# Patient Record
Sex: MALE | Race: OTHER | HISPANIC OR LATINO | URBAN - METROPOLITAN AREA
[De-identification: names, ages, dates, MRNs, and addresses within clinical notes are randomized per-mention and may not be internally consistent; named-entity substitution may affect disease eponyms.]

---

## 2017-06-05 ENCOUNTER — OUTPATIENT (OUTPATIENT)
Dept: OUTPATIENT SERVICES | Facility: HOSPITAL | Age: 42
LOS: 1 days | Discharge: HOME | End: 2017-06-05

## 2017-06-05 ENCOUNTER — APPOINTMENT (OUTPATIENT)
Dept: PODIATRY | Facility: CLINIC | Age: 42
End: 2017-06-05

## 2017-06-05 VITALS
BODY MASS INDEX: 36.91 KG/M2 | SYSTOLIC BLOOD PRESSURE: 110 MMHG | WEIGHT: 188 LBS | DIASTOLIC BLOOD PRESSURE: 70 MMHG | HEART RATE: 76 BPM | HEIGHT: 60 IN

## 2017-06-28 DIAGNOSIS — M79.671 PAIN IN RIGHT FOOT: ICD-10-CM

## 2017-06-28 DIAGNOSIS — B35.1 TINEA UNGUIUM: ICD-10-CM

## 2017-06-28 DIAGNOSIS — L60.0 INGROWING NAIL: ICD-10-CM

## 2017-06-28 DIAGNOSIS — M79.672 PAIN IN LEFT FOOT: ICD-10-CM

## 2017-08-22 ENCOUNTER — OUTPATIENT (OUTPATIENT)
Dept: OUTPATIENT SERVICES | Facility: HOSPITAL | Age: 42
LOS: 1 days | Discharge: HOME | End: 2017-08-22

## 2017-09-27 ENCOUNTER — APPOINTMENT (OUTPATIENT)
Dept: INTERNAL MEDICINE | Facility: CLINIC | Age: 42
End: 2017-09-27

## 2017-09-27 ENCOUNTER — OUTPATIENT (OUTPATIENT)
Dept: OUTPATIENT SERVICES | Facility: HOSPITAL | Age: 42
LOS: 1 days | Discharge: HOME | End: 2017-09-27

## 2017-09-27 VITALS
BODY MASS INDEX: 31.37 KG/M2 | DIASTOLIC BLOOD PRESSURE: 80 MMHG | WEIGHT: 186 LBS | HEART RATE: 66 BPM | SYSTOLIC BLOOD PRESSURE: 132 MMHG | HEIGHT: 64.5 IN

## 2017-09-28 LAB
T4 AB SER-ACNC: 7.3 UG/DL
T4 FREE SERPL-MCNC: 1.2 NG/DL
TSH SERPL DL<=0.005 MIU/L-ACNC: 5.4 UIU/ML

## 2017-10-16 ENCOUNTER — APPOINTMENT (OUTPATIENT)
Dept: NUTRITION | Facility: CLINIC | Age: 42
End: 2017-10-16

## 2017-10-16 VITALS — WEIGHT: 184.5 LBS | BODY MASS INDEX: 31.18 KG/M2

## 2017-10-30 ENCOUNTER — APPOINTMENT (OUTPATIENT)
Dept: INTERNAL MEDICINE | Facility: CLINIC | Age: 42
End: 2017-10-30

## 2017-10-30 ENCOUNTER — OUTPATIENT (OUTPATIENT)
Dept: OUTPATIENT SERVICES | Facility: HOSPITAL | Age: 42
LOS: 1 days | Discharge: HOME | End: 2017-10-30

## 2017-10-30 VITALS
WEIGHT: 183 LBS | HEIGHT: 64.5 IN | SYSTOLIC BLOOD PRESSURE: 113 MMHG | BODY MASS INDEX: 30.86 KG/M2 | HEART RATE: 81 BPM | DIASTOLIC BLOOD PRESSURE: 75 MMHG

## 2017-11-28 ENCOUNTER — OUTPATIENT (OUTPATIENT)
Dept: OUTPATIENT SERVICES | Facility: HOSPITAL | Age: 42
LOS: 1 days | Discharge: HOME | End: 2017-11-28

## 2018-02-20 ENCOUNTER — OUTPATIENT (OUTPATIENT)
Dept: OUTPATIENT SERVICES | Facility: HOSPITAL | Age: 43
LOS: 1 days | Discharge: HOME | End: 2018-02-20

## 2018-02-20 DIAGNOSIS — K02.53 DENTAL CARIES ON PIT AND FISSURE SURFACE PENETRATING INTO PULP: ICD-10-CM

## 2018-04-26 ENCOUNTER — OUTPATIENT (OUTPATIENT)
Dept: OUTPATIENT SERVICES | Facility: HOSPITAL | Age: 43
LOS: 1 days | Discharge: HOME | End: 2018-04-26

## 2018-04-27 DIAGNOSIS — K02.53 DENTAL CARIES ON PIT AND FISSURE SURFACE PENETRATING INTO PULP: ICD-10-CM

## 2018-05-10 ENCOUNTER — OUTPATIENT (OUTPATIENT)
Dept: OUTPATIENT SERVICES | Facility: HOSPITAL | Age: 43
LOS: 1 days | Discharge: HOME | End: 2018-05-10

## 2018-05-10 DIAGNOSIS — K02.53 DENTAL CARIES ON PIT AND FISSURE SURFACE PENETRATING INTO PULP: ICD-10-CM

## 2018-05-11 ENCOUNTER — OUTPATIENT (OUTPATIENT)
Dept: OUTPATIENT SERVICES | Facility: HOSPITAL | Age: 43
LOS: 1 days | Discharge: HOME | End: 2018-05-11

## 2018-05-24 ENCOUNTER — OUTPATIENT (OUTPATIENT)
Dept: OUTPATIENT SERVICES | Facility: HOSPITAL | Age: 43
LOS: 1 days | Discharge: HOME | End: 2018-05-24

## 2018-05-24 DIAGNOSIS — E02 SUBCLINICAL IODINE-DEFICIENCY HYPOTHYROIDISM: ICD-10-CM

## 2018-05-25 LAB — TSH SERPL-ACNC: 8.61 UIU/ML

## 2018-05-29 ENCOUNTER — FORM ENCOUNTER (OUTPATIENT)
Age: 43
End: 2018-05-29

## 2018-05-30 ENCOUNTER — OUTPATIENT (OUTPATIENT)
Dept: OUTPATIENT SERVICES | Facility: HOSPITAL | Age: 43
LOS: 1 days | Discharge: HOME | End: 2018-05-30

## 2018-05-30 ENCOUNTER — APPOINTMENT (OUTPATIENT)
Dept: INTERNAL MEDICINE | Facility: CLINIC | Age: 43
End: 2018-05-30

## 2018-05-30 VITALS
DIASTOLIC BLOOD PRESSURE: 80 MMHG | WEIGHT: 185 LBS | HEART RATE: 72 BPM | SYSTOLIC BLOOD PRESSURE: 119 MMHG | BODY MASS INDEX: 31.2 KG/M2 | HEIGHT: 64.5 IN

## 2018-05-30 DIAGNOSIS — E03.9 HYPOTHYROIDISM, UNSPECIFIED: ICD-10-CM

## 2018-05-30 DIAGNOSIS — M19.90 UNSPECIFIED OSTEOARTHRITIS, UNSPECIFIED SITE: ICD-10-CM

## 2018-05-30 DIAGNOSIS — M17.0 BILATERAL PRIMARY OSTEOARTHRITIS OF KNEE: ICD-10-CM

## 2018-05-30 RX ORDER — NYSTATIN 100000 [USP'U]/G
100000 CREAM TOPICAL
Qty: 3 | Refills: 10 | Status: DISCONTINUED | COMMUNITY
Start: 2017-06-05 | End: 2018-05-30

## 2018-05-30 NOTE — ASSESSMENT
[FreeTextEntry1] : 42 M w/ no significant pmh besides subclinical hypothyroidism not on any treatment who presents to the office for after repeat TSH is 8.6.\par \par # Hypothyroidism\par - Start Levothyroxine 25mcg\par \par # Knee pain\par - B/l Knee xrays\par - tylenol prn\par \par # HCM\par - F/u 6 months and prn\par - Repeat TSH

## 2018-05-30 NOTE — HISTORY OF PRESENT ILLNESS
[de-identified] : 42 M w/ no pmh here for a follow up on TSH results. Last year he had a TSh of 5.4 and he was asymptomatic so no treatment was initiated. He now has a recent TSH above 8 and presents for the results. He currently has no complaints besides occasional forgetfulness, fatigue and a " crackling " sound when flexing his knees, denies any pain, swelling etc.

## 2018-09-20 ENCOUNTER — OUTPATIENT (OUTPATIENT)
Dept: OUTPATIENT SERVICES | Facility: HOSPITAL | Age: 43
LOS: 1 days | Discharge: HOME | End: 2018-09-20

## 2018-09-20 DIAGNOSIS — K02.53 DENTAL CARIES ON PIT AND FISSURE SURFACE PENETRATING INTO PULP: ICD-10-CM

## 2018-11-08 ENCOUNTER — OUTPATIENT (OUTPATIENT)
Dept: OUTPATIENT SERVICES | Facility: HOSPITAL | Age: 43
LOS: 1 days | Discharge: HOME | End: 2018-11-08

## 2018-11-12 LAB — TSH SERPL-ACNC: 12.58 UIU/ML

## 2018-11-14 ENCOUNTER — APPOINTMENT (OUTPATIENT)
Dept: INTERNAL MEDICINE | Facility: CLINIC | Age: 43
End: 2018-11-14

## 2018-11-14 ENCOUNTER — OUTPATIENT (OUTPATIENT)
Dept: OUTPATIENT SERVICES | Facility: HOSPITAL | Age: 43
LOS: 1 days | Discharge: HOME | End: 2018-11-14

## 2018-11-14 VITALS
DIASTOLIC BLOOD PRESSURE: 80 MMHG | HEART RATE: 63 BPM | BODY MASS INDEX: 32.44 KG/M2 | SYSTOLIC BLOOD PRESSURE: 129 MMHG | TEMPERATURE: 96 F | HEIGHT: 64 IN | WEIGHT: 190 LBS

## 2018-11-14 NOTE — HISTORY OF PRESENT ILLNESS
[de-identified] : 42 M w/ no pmh here for a follow up on TSH results. On Last visit, the patient's TSH was above 8 and he was started on therapy. TSH recheck shows an increase to 12. Patient denies any symptoms. No significant weight gain, constipation, hair loss, or fatigue. Patient says he feels well. He has been compliant with his medication. Denies any known family history of thyroid disorders.

## 2018-11-14 NOTE — REVIEW OF SYSTEMS
[Fever] : no fever [Chills] : no chills [Fatigue] : no fatigue [Night Sweats] : no night sweats [Recent Change In Weight] : ~T no recent weight change [Vision Problems] : no vision problems [Chest Pain] : no chest pain [Palpitations] : no palpitations [Shortness Of Breath] : no shortness of breath [Wheezing] : no wheezing [Cough] : no cough [Abdominal Pain] : no abdominal pain [Nausea] : no nausea [Constipation] : no constipation [Vomiting] : no vomiting [Dysuria] : no dysuria [Incontinence] : no incontinence [Frequency] : no frequency [Joint Pain] : no joint pain [Back Pain] : no back pain [Joint Swelling] : no joint swelling

## 2018-11-14 NOTE — ASSESSMENT
[FreeTextEntry1] : 42 M w/ no significant pmh besides subclinical hypothyroidism not on any treatment who presents to the office after repeat TSH is 12. Denies any active symptoms \par \par # Hypothyroidism\par - Increase Levothyroxine to 50mcg\par - Check Free T3 and T4\par \par # HCM\par - Routine labs including CBC, CMP, and lipid profile \par - F/u 6 months and prn\par \par

## 2018-11-14 NOTE — PHYSICAL EXAM
[No Acute Distress] : no acute distress [Well Nourished] : well nourished [Normal Sclera/Conjunctiva] : normal sclera/conjunctiva [Normal Outer Ear/Nose] : the outer ears and nose were normal in appearance [No Respiratory Distress] : no respiratory distress  [Clear to Auscultation] : lungs were clear to auscultation bilaterally [Normal Rate] : normal rate  [Regular Rhythm] : with a regular rhythm [Soft] : abdomen soft [Non Tender] : non-tender [No HSM] : no HSM [Normal Bowel Sounds] : normal bowel sounds [No CVA Tenderness] : no CVA  tenderness [No Joint Swelling] : no joint swelling

## 2018-11-29 ENCOUNTER — OUTPATIENT (OUTPATIENT)
Dept: OUTPATIENT SERVICES | Facility: HOSPITAL | Age: 43
LOS: 1 days | Discharge: HOME | End: 2018-11-29

## 2018-12-11 ENCOUNTER — OUTPATIENT (OUTPATIENT)
Dept: OUTPATIENT SERVICES | Facility: HOSPITAL | Age: 43
LOS: 1 days | Discharge: HOME | End: 2018-12-11

## 2018-12-11 DIAGNOSIS — K02.9 DENTAL CARIES, UNSPECIFIED: ICD-10-CM

## 2018-12-18 ENCOUNTER — OUTPATIENT (OUTPATIENT)
Dept: OUTPATIENT SERVICES | Facility: HOSPITAL | Age: 43
LOS: 1 days | Discharge: HOME | End: 2018-12-18

## 2019-02-15 ENCOUNTER — APPOINTMENT (OUTPATIENT)
Dept: INTERNAL MEDICINE | Facility: CLINIC | Age: 44
End: 2019-02-15

## 2022-06-13 ENCOUNTER — APPOINTMENT (OUTPATIENT)
Dept: INTERNAL MEDICINE | Facility: CLINIC | Age: 47
End: 2022-06-13
Payer: SUBSIDIZED

## 2022-06-13 ENCOUNTER — OUTPATIENT (OUTPATIENT)
Dept: OUTPATIENT SERVICES | Facility: HOSPITAL | Age: 47
LOS: 1 days | Discharge: HOME | End: 2022-06-13

## 2022-06-13 VITALS
SYSTOLIC BLOOD PRESSURE: 127 MMHG | TEMPERATURE: 97.3 F | BODY MASS INDEX: 32.44 KG/M2 | OXYGEN SATURATION: 96 % | WEIGHT: 190 LBS | HEIGHT: 64 IN | DIASTOLIC BLOOD PRESSURE: 86 MMHG | HEART RATE: 106 BPM

## 2022-06-13 DIAGNOSIS — B35.6 TINEA CRURIS: ICD-10-CM

## 2022-06-13 PROCEDURE — 99214 OFFICE O/P EST MOD 30 MIN: CPT | Mod: GC

## 2022-06-13 NOTE — ASSESSMENT
[FreeTextEntry1] : 46 M w/ no significant PMH besides subclinical hypothyroidism not on any treatment who presents to the office after repeat TSH is 12. Denies any active symptoms \par \par # Hypothyroidism/constipation\par - Patient did not take any medications for 4 years \par - Check  TSH \par - consider restart levothyroxine pending TSH level\par - RTC 1 month\par - hydration\par - high fiber diet\par \par #Skin rash \par -located at the Scrotal area bilaterally, looking like its healing with over the counter cream\par -Continue using the OTC cream lotrimin to affected area of groin topically twice daily \par \par # HCM\par - Routine labs including CBC, CMP, and lipid profile, A1C, TSH\par - F/u 1 months with blood work up \par - GI referral for Screening colonoscopy \par \par

## 2022-06-13 NOTE — HISTORY OF PRESENT ILLNESS
[[employer] ] : provided by [employer]   [Interpreters_IDNumber] : [id number] [de-identified] : 46 M w/ no PMH here for his annual exam. On Last visit (4 years ago), the patient's TSH was above 8 and he was started on medication.   TSH recheck shows an increase to 12.  However, pt. stopped medication once ran out of medication, and did not return to follow up.  Patient complains of constipation today that has been going on for the last 6 months ( described as hard stool but have regular bowel movement ). Patient denied any fatigue/lethargy, change in appetite or weight, cold intolerance. Patient also complained of itchy rash in the groin region.

## 2022-06-13 NOTE — PHYSICAL EXAM
[Well Developed] : well developed [No Accessory Muscle Use] : no accessory muscle use [Normal S1, S2] : normal S1 and S2 [No Acute Distress] : no acute distress [Well Nourished] : well nourished [Normal Sclera/Conjunctiva] : normal sclera/conjunctiva [Normal Outer Ear/Nose] : the outer ears and nose were normal in appearance [No Respiratory Distress] : no respiratory distress  [Clear to Auscultation] : lungs were clear to auscultation bilaterally [Normal Rate] : normal rate  [Regular Rhythm] : with a regular rhythm [Soft] : abdomen soft [Non Tender] : non-tender [No HSM] : no HSM [No CVA Tenderness] : no CVA  tenderness [No Joint Swelling] : no joint swelling [de-identified] : mild erythema/edema of b/l groin area

## 2022-06-14 ENCOUNTER — LABORATORY RESULT (OUTPATIENT)
Age: 47
End: 2022-06-14

## 2022-06-15 ENCOUNTER — NON-APPOINTMENT (OUTPATIENT)
Age: 47
End: 2022-06-15

## 2022-06-15 LAB
ALBUMIN SERPL ELPH-MCNC: 4.9 G/DL
ALP BLD-CCNC: 106 U/L
ALT SERPL-CCNC: 102 U/L
ANION GAP SERPL CALC-SCNC: 13 MMOL/L
AST SERPL-CCNC: 53 U/L
BASOPHILS # BLD AUTO: 0.04 K/UL
BASOPHILS NFR BLD AUTO: 0.5 %
BILIRUB SERPL-MCNC: 0.9 MG/DL
BUN SERPL-MCNC: 20 MG/DL
CALCIUM SERPL-MCNC: 9.6 MG/DL
CHLORIDE SERPL-SCNC: 103 MMOL/L
CHOLEST SERPL-MCNC: 195 MG/DL
CO2 SERPL-SCNC: 25 MMOL/L
CREAT SERPL-MCNC: 0.9 MG/DL
EGFR: 107 ML/MIN/1.73M2
EOSINOPHIL # BLD AUTO: 0.13 K/UL
EOSINOPHIL NFR BLD AUTO: 1.7 %
ESTIMATED AVERAGE GLUCOSE: 154 MG/DL
GLUCOSE SERPL-MCNC: 116 MG/DL
HBA1C MFR BLD HPLC: 7 %
HCT VFR BLD CALC: 46.4 %
HDLC SERPL-MCNC: 51 MG/DL
HGB BLD-MCNC: 16.1 G/DL
IMM GRANULOCYTES NFR BLD AUTO: 0.3 %
LDLC SERPL CALC-MCNC: 119 MG/DL
LYMPHOCYTES # BLD AUTO: 2.95 K/UL
LYMPHOCYTES NFR BLD AUTO: 39.6 %
MAN DIFF?: NORMAL
MCHC RBC-ENTMCNC: 31.8 PG
MCHC RBC-ENTMCNC: 34.7 G/DL
MCV RBC AUTO: 91.5 FL
MONOCYTES # BLD AUTO: 0.29 K/UL
MONOCYTES NFR BLD AUTO: 3.9 %
NEUTROPHILS # BLD AUTO: 4.02 K/UL
NEUTROPHILS NFR BLD AUTO: 54 %
NONHDLC SERPL-MCNC: 144 MG/DL
PLATELET # BLD AUTO: 170 K/UL
POTASSIUM SERPL-SCNC: 4.3 MMOL/L
PROT SERPL-MCNC: 7.7 G/DL
RBC # BLD: 5.07 M/UL
RBC # FLD: 12.6 %
SODIUM SERPL-SCNC: 141 MMOL/L
TRIGL SERPL-MCNC: 125 MG/DL
TSH SERPL-ACNC: 7.5 UIU/ML
WBC # FLD AUTO: 7.45 K/UL

## 2022-06-23 ENCOUNTER — OUTPATIENT (OUTPATIENT)
Dept: OUTPATIENT SERVICES | Facility: HOSPITAL | Age: 47
LOS: 1 days | Discharge: HOME | End: 2022-06-23

## 2022-06-23 ENCOUNTER — APPOINTMENT (OUTPATIENT)
Dept: INTERNAL MEDICINE | Facility: CLINIC | Age: 47
End: 2022-06-23
Payer: SUBSIDIZED

## 2022-06-23 ENCOUNTER — LABORATORY RESULT (OUTPATIENT)
Age: 47
End: 2022-06-23

## 2022-06-23 ENCOUNTER — NON-APPOINTMENT (OUTPATIENT)
Age: 47
End: 2022-06-23

## 2022-06-23 VITALS
BODY MASS INDEX: 37.3 KG/M2 | DIASTOLIC BLOOD PRESSURE: 76 MMHG | TEMPERATURE: 97.5 F | HEART RATE: 79 BPM | WEIGHT: 190 LBS | HEIGHT: 60 IN | SYSTOLIC BLOOD PRESSURE: 120 MMHG | OXYGEN SATURATION: 99 %

## 2022-06-23 DIAGNOSIS — Z00.00 ENCOUNTER FOR GENERAL ADULT MEDICAL EXAMINATION WITHOUT ABNORMAL FINDINGS: ICD-10-CM

## 2022-06-23 DIAGNOSIS — B35.6 TINEA CRURIS: ICD-10-CM

## 2022-06-23 DIAGNOSIS — K59.00 CONSTIPATION, UNSPECIFIED: ICD-10-CM

## 2022-06-23 DIAGNOSIS — E03.8 OTHER SPECIFIED HYPOTHYROIDISM: ICD-10-CM

## 2022-06-23 PROCEDURE — 99214 OFFICE O/P EST MOD 30 MIN: CPT | Mod: GC

## 2022-06-23 RX ORDER — LEVOTHYROXINE SODIUM 0.05 MG/1
50 TABLET ORAL DAILY
Qty: 30 | Refills: 5 | Status: DISCONTINUED | COMMUNITY
Start: 2018-11-14 | End: 2022-06-23

## 2022-06-23 RX ORDER — LEVOTHYROXINE SODIUM 0.03 MG/1
25 TABLET ORAL DAILY
Qty: 30 | Refills: 6 | Status: DISCONTINUED | COMMUNITY
Start: 2018-05-30 | End: 2022-06-23

## 2022-06-23 NOTE — ASSESSMENT
[FreeTextEntry1] : This is a 45 y/o M with PMHx of hypothyroidism and constipation is here for a follow up visit for labs.\par \par #Subclinical Hypothyroidism\par #Constipation \par - TSH 7.50, Free T4 0.9\par - check T3 level \par - starting on levothyroxine 50mcg daily\par - hydration\par - high fiber diet\par \par #DM \par - Hb A1C 7.0\par - diet and lifestyle modification\par - starting on Metformin 500mg BID  \par - Podiatry and Omphalotomy referral \par \par #Transaminitis\par - denies abdominal pain \par - AST 53, \par - RUQ US \par \par #HLD \par - , HDL 51, Cholesterol 195, Triglyceride 125\par - Diet and lifestyle modification advised \par - not starting on statin due to transaminitis, reevaluate based on LFTs \par \par #Skin rash - resolved \par \par # HCM\par - COVID Vaccine x3 Moderna \par - GI for screening colonoscopy \par - Repeat labs \par - f/u in 3 months \par

## 2022-06-23 NOTE — PHYSICAL EXAM
[No Acute Distress] : no acute distress [Well Nourished] : well nourished [Well Developed] : well developed [Well-Appearing] : well-appearing [Normal Sclera/Conjunctiva] : normal sclera/conjunctiva [Normal Outer Ear/Nose] : the outer ears and nose were normal in appearance [No JVD] : no jugular venous distention [No Respiratory Distress] : no respiratory distress  [No Accessory Muscle Use] : no accessory muscle use [Clear to Auscultation] : lungs were clear to auscultation bilaterally [Normal Rate] : normal rate  [Regular Rhythm] : with a regular rhythm [Normal S1, S2] : normal S1 and S2 [No Murmur] : no murmur heard [Soft] : abdomen soft [Non Tender] : non-tender [Non-distended] : non-distended [No Masses] : no abdominal mass palpated [No HSM] : no HSM [Normal Bowel Sounds] : normal bowel sounds

## 2022-06-23 NOTE — REVIEW OF SYSTEMS
[Constipation] : constipation [Fever] : no fever [Chills] : no chills [Fatigue] : no fatigue [Night Sweats] : no night sweats [Discharge] : no discharge [Earache] : no earache [Chest Pain] : no chest pain [Palpitations] : no palpitations [Claudication] : no  leg claudication [Lower Ext Edema] : no lower extremity edema [Orthopena] : no orthopnea [Paroxysmal Nocturnal Dyspnea] : no paroxysmal nocturnal dyspnea [Shortness Of Breath] : no shortness of breath [Wheezing] : no wheezing [Cough] : no cough [Dyspnea on Exertion] : not dyspnea on exertion [Abdominal Pain] : no abdominal pain [Nausea] : no nausea [Diarrhea] : no diarrhea [Vomiting] : no vomiting [Heartburn] : no heartburn [Melena] : no melena [Dysuria] : no dysuria [Headache] : no headache

## 2022-06-23 NOTE — HISTORY OF PRESENT ILLNESS
[FreeTextEntry1] : Follow up visit  [de-identified] : This is a 45 y/o M with PMHx of hypothyroidism and constipation is here for a follow up visit for labs. Patient was seen in the clinic 10 days ago for establishment of care. \par Labs significant for Hb A1C of 7.0, transaminitis, HLD, TSH of 7.50 and  Free T4 0.9. \par \par History was obtained using  service [facility] ([id number])\par \par Patient endorses constipation. Patient denies abdominal pain, n/v. Patient denies alcohol use. Patient endorses taking Tylenol sometimes.

## 2022-06-27 DIAGNOSIS — E78.5 HYPERLIPIDEMIA, UNSPECIFIED: ICD-10-CM

## 2022-06-27 DIAGNOSIS — K59.00 CONSTIPATION, UNSPECIFIED: ICD-10-CM

## 2022-06-27 DIAGNOSIS — R74.01 ELEVATION OF LEVELS OF LIVER TRANSAMINASE LEVELS: ICD-10-CM

## 2022-06-27 DIAGNOSIS — Z00.00 ENCOUNTER FOR GENERAL ADULT MEDICAL EXAMINATION WITHOUT ABNORMAL FINDINGS: ICD-10-CM

## 2022-06-27 DIAGNOSIS — E11.9 TYPE 2 DIABETES MELLITUS WITHOUT COMPLICATIONS: ICD-10-CM

## 2022-06-27 DIAGNOSIS — E03.9 HYPOTHYROIDISM, UNSPECIFIED: ICD-10-CM

## 2022-06-28 ENCOUNTER — NON-APPOINTMENT (OUTPATIENT)
Age: 47
End: 2022-06-28

## 2022-07-11 ENCOUNTER — OUTPATIENT (OUTPATIENT)
Dept: OUTPATIENT SERVICES | Facility: HOSPITAL | Age: 47
LOS: 1 days | Discharge: HOME | End: 2022-07-11

## 2022-07-11 ENCOUNTER — APPOINTMENT (OUTPATIENT)
Dept: PODIATRY | Facility: CLINIC | Age: 47
End: 2022-07-11

## 2022-07-11 PROCEDURE — 99203 OFFICE O/P NEW LOW 30 MIN: CPT

## 2022-07-12 ENCOUNTER — OUTPATIENT (OUTPATIENT)
Dept: OUTPATIENT SERVICES | Facility: HOSPITAL | Age: 47
LOS: 1 days | Discharge: HOME | End: 2022-07-12

## 2022-07-12 ENCOUNTER — RESULT REVIEW (OUTPATIENT)
Age: 47
End: 2022-07-12

## 2022-07-12 DIAGNOSIS — R10.9 UNSPECIFIED ABDOMINAL PAIN: ICD-10-CM

## 2022-07-12 PROCEDURE — 76705 ECHO EXAM OF ABDOMEN: CPT | Mod: 26

## 2022-07-13 ENCOUNTER — OUTPATIENT (OUTPATIENT)
Dept: OUTPATIENT SERVICES | Facility: HOSPITAL | Age: 47
LOS: 1 days | Discharge: HOME | End: 2022-07-13

## 2022-07-13 ENCOUNTER — APPOINTMENT (OUTPATIENT)
Dept: OPHTHALMOLOGY | Facility: CLINIC | Age: 47
End: 2022-07-13

## 2022-07-13 PROCEDURE — 92004 COMPRE OPH EXAM NEW PT 1/>: CPT

## 2022-07-21 DIAGNOSIS — B35.1 TINEA UNGUIUM: ICD-10-CM

## 2022-07-21 DIAGNOSIS — M79.671 PAIN IN RIGHT FOOT: ICD-10-CM

## 2022-07-21 DIAGNOSIS — M79.672 PAIN IN LEFT FOOT: ICD-10-CM

## 2022-07-21 NOTE — ASSESSMENT
[Verbal] : verbal [Patient] : patient [Good - alert, interested, motivated] : Good - alert, interested, motivated [FreeTextEntry1] : Assessment:\par Onychomycosis x10 \par \par Plan:\par patient seen and evaluated with all questions and concerns answerd\par Demonstrated the use of ciclopiroc to be applied once daily to all toe nails\par RX Ciclopiroc 8% solution to be applied daily with removal after 7 days with ETOH\par RTC 1 month for f/u

## 2022-07-21 NOTE — HISTORY OF PRESENT ILLNESS
[Sneakers] : dante [FreeTextEntry1] : 45yo M who presents today with nonpainful fungul toe nails.\par Patient says his nails have been discolored for 14 years and he has never treated them before. \par Patient has no other pedal complaints at this time.

## 2022-07-21 NOTE — PHYSICAL EXAM
[General Appearance - Alert] : alert [General Appearance - In No Acute Distress] : in no acute distress [General Appearance - Well Nourished] : well nourished [General Appearance - Well Developed] : well developed [General Appearance - Well-Appearing] : healthy appearing [2+] : left foot dorsalis pedis 2+ [No Joint Swelling] : no joint swelling [Pes Planus] : pes planus deformity [Skin Color & Pigmentation] : normal skin color and pigmentation [Skin Turgor] : normal skin turgor [Skin Lesions] : no skin lesions [Oriented To Time, Place, And Person] : oriented to person, place, and time [Impaired Insight] : insight and judgment were intact [Affect] : the affect was normal [Mood] : the mood was normal [Ankle Swelling (On Exam)] : not present [Varicose Veins Of Lower Extremities] : not present [] : not present [Delayed in the Right Toes] : capillary refills normal in right toes [Delayed in the Left Toes] : capillary refills normal in the left toes [Foot Ulcer] : no foot ulcer [Skin Induration] : no skin induration [Diminished Throughout Right Foot] : normal sensation with monofilament testing throughout right foot [Diminished Throughout Left Foot] : normal sensation with monofilament testing throughout left foot

## 2022-08-02 LAB
ALBUMIN SERPL ELPH-MCNC: 5 G/DL
ALP BLD-CCNC: 91 U/L
ALT SERPL-CCNC: 40 U/L
ANION GAP SERPL CALC-SCNC: 12 MMOL/L
AST SERPL-CCNC: 22 U/L
BASOPHILS # BLD AUTO: 0.04 K/UL
BASOPHILS NFR BLD AUTO: 0.5 %
BILIRUB SERPL-MCNC: 0.6 MG/DL
BUN SERPL-MCNC: 24 MG/DL
CALCIUM SERPL-MCNC: 9.8 MG/DL
CHLORIDE SERPL-SCNC: 102 MMOL/L
CHOLEST SERPL-MCNC: 171 MG/DL
CO2 SERPL-SCNC: 27 MMOL/L
CREAT SERPL-MCNC: 0.9 MG/DL
EGFR: 107 ML/MIN/1.73M2
EOSINOPHIL # BLD AUTO: 0.06 K/UL
EOSINOPHIL NFR BLD AUTO: 0.8 %
ESTIMATED AVERAGE GLUCOSE: 131 MG/DL
GLUCOSE SERPL-MCNC: 92 MG/DL
HBA1C MFR BLD HPLC: 6.2 %
HCT VFR BLD CALC: 46.6 %
HDLC SERPL-MCNC: 52 MG/DL
HGB BLD-MCNC: 15.9 G/DL
IMM GRANULOCYTES NFR BLD AUTO: 0.1 %
LDLC SERPL CALC-MCNC: 103 MG/DL
LYMPHOCYTES # BLD AUTO: 2.86 K/UL
LYMPHOCYTES NFR BLD AUTO: 37.4 %
MAN DIFF?: NORMAL
MCHC RBC-ENTMCNC: 31.1 PG
MCHC RBC-ENTMCNC: 34.1 G/DL
MCV RBC AUTO: 91.2 FL
MONOCYTES # BLD AUTO: 0.49 K/UL
MONOCYTES NFR BLD AUTO: 6.4 %
NEUTROPHILS # BLD AUTO: 4.19 K/UL
NEUTROPHILS NFR BLD AUTO: 54.8 %
NONHDLC SERPL-MCNC: 119 MG/DL
PLATELET # BLD AUTO: 173 K/UL
POTASSIUM SERPL-SCNC: 4.1 MMOL/L
PROT SERPL-MCNC: 7.5 G/DL
RBC # BLD: 5.11 M/UL
RBC # FLD: 12.3 %
SODIUM SERPL-SCNC: 141 MMOL/L
TRIGL SERPL-MCNC: 81 MG/DL
TSH SERPL-ACNC: 4.35 UIU/ML
WBC # FLD AUTO: 7.65 K/UL

## 2022-08-03 ENCOUNTER — NON-APPOINTMENT (OUTPATIENT)
Age: 47
End: 2022-08-03

## 2022-08-03 LAB
25(OH)D3 SERPL-MCNC: 27 NG/ML
T3FREE SERPL-MCNC: 3.67 PG/ML

## 2022-08-11 ENCOUNTER — NON-APPOINTMENT (OUTPATIENT)
Age: 47
End: 2022-08-11

## 2022-08-11 ENCOUNTER — OUTPATIENT (OUTPATIENT)
Dept: OUTPATIENT SERVICES | Facility: HOSPITAL | Age: 47
LOS: 1 days | Discharge: HOME | End: 2022-08-11

## 2022-08-11 ENCOUNTER — APPOINTMENT (OUTPATIENT)
Dept: INTERNAL MEDICINE | Facility: CLINIC | Age: 47
End: 2022-08-11

## 2022-08-11 VITALS
SYSTOLIC BLOOD PRESSURE: 128 MMHG | BODY MASS INDEX: 35.93 KG/M2 | HEIGHT: 60 IN | OXYGEN SATURATION: 99 % | WEIGHT: 183 LBS | DIASTOLIC BLOOD PRESSURE: 80 MMHG | HEART RATE: 74 BPM | TEMPERATURE: 97.1 F

## 2022-08-11 DIAGNOSIS — H11.31 CONJUNCTIVAL HEMORRHAGE, RIGHT EYE: ICD-10-CM

## 2022-08-11 PROCEDURE — 99214 OFFICE O/P EST MOD 30 MIN: CPT | Mod: 25,GC

## 2022-08-11 NOTE — ASSESSMENT
[FreeTextEntry1] : 47YO M with PMHx of hypothyroidism, constipation, T2D, HLD, transaminitis who presents today with 1 day history of right eye hematoma of the lateral sclera. \par \par #Right Eye Lateral Scleral Hematoma\par -reassurance that will resolve in about a week \par -pt has 3 month follow-up appointment next month\par \par #Subclinical Hypothyroidism\par #Constipation \par - TSH 7.5, Free T4 0.9\par - check T3 level \par - c/w levothyroxine 50mcg daily\par - c/w hydration\par - c/w high fiber diet\par - follow-up labs in 3 month f/u appointment in Sept 2022\par \par #T2D\par - Hb A1C 7.0\par - diet and lifestyle modification\par - c/w Metformin 500mg BID \par - saw ophthal and podiatry last month (July 2022)\par \par #Transaminitis\par - denies abdominal pain \par - RUQ US completed July 2022; report was normal \par \par #HLD \par - , HDL 51, Cholesterol 195, Triglyceride 125\par - Diet and lifestyle modification advised; continue\par - not starting on statin due to transaminitis, reevaluate based on LFTs in September\par \par # HCM\par - COVID Vaccine x3 Moderna \par - Tdap Vaccine today \par - Screening colonoscopy scheduled for September \par - Repeat labs prior to 3mo f/u appointment in September \par - f/u in 3 months (September)

## 2022-08-11 NOTE — REVIEW OF SYSTEMS
[Redness] : redness [Fever] : no fever [Chills] : no chills [Fatigue] : no fatigue [Night Sweats] : no night sweats [Discharge] : no discharge [Pain] : no pain [Dryness] : no dryness [Vision Problems] : no vision problems [Itching] : no itching [Nasal Discharge] : no nasal discharge [Sore Throat] : no sore throat [Chest Pain] : no chest pain [Palpitations] : no palpitations [Orthopena] : no orthopnea [Paroxysmal Nocturnal Dyspnea] : no paroxysmal nocturnal dyspnea [Shortness Of Breath] : no shortness of breath [Wheezing] : no wheezing [Cough] : no cough [Dyspnea on Exertion] : not dyspnea on exertion [Abdominal Pain] : no abdominal pain [Nausea] : no nausea [Constipation] : no constipation [Diarrhea] : no diarrhea [Vomiting] : no vomiting [Dysuria] : no dysuria [Hematuria] : no hematuria [Itching] : no itching [Skin Rash] : no skin rash [Headache] : no headache [Dizziness] : no dizziness [Fainting] : no fainting [Easy Bleeding] : no easy bleeding [Easy Bruising] : no easy bruising

## 2022-08-11 NOTE — HISTORY OF PRESENT ILLNESS
[FreeTextEntry1] : right eye redness [de-identified] : 47YO M w/PMHx of hypothyroidism, constipation, transaminitis, T2D, HLD who presents today with c/o 1 day history of redness on lateral half of right eye sclera. Denies any associated pain, itching, discharge, crusting in the morning. Pt works in a [employer] and was went outside when this occurred. Denies any trauma, coughs, excessive straining w/BM or lifting heavy items recently. \par \par Pt's has been taking levothyroxine as prescribed. Constipation has since resolved since last visit on 6/23/2022 (previously did not take medications for 6+ months). BMs now occur daily. Mostly brown and formed, but occasionally noticed black stools. Denies hematochezia. Pt has a colonoscopy scheduled for 9/9/2022. Otherwise denies any cold intolerance, fatigue, weight changes. \par \par Pt was tested positive for COVID on 6/29/2022. Was seen in urgent care and prescribed paxlovid. Only had 2 days of fever, mild cough. Currently denies any chest pains, palpitations, dyspnea, peripheral edema. \par \par RUQ ultrasound was done in July for his transaminitis. Liver, bile ducts, gallbladder were all normal. Denies any abdominal pains. \par \par Since last visit, pt also so opthalmology and pt reports normal funduscopic exam. Podiatry also seen.  \par \par Family history significant for T2D in multiple family members including mother and brother. \par Never smoker. Denies drinking alcohol or using recreational drugs.

## 2022-08-11 NOTE — PHYSICAL EXAM
[No Acute Distress] : no acute distress [Well Nourished] : well nourished [Normal Voice/Communication] : normal voice/communication [PERRL] : pupils equal round and reactive to light [EOMI] : extraocular movements intact [Normal Outer Ear/Nose] : the outer ears and nose were normal in appearance [Normal Oropharynx] : the oropharynx was normal [No JVD] : no jugular venous distention [No Lymphadenopathy] : no lymphadenopathy [Supple] : supple [No Respiratory Distress] : no respiratory distress  [No Accessory Muscle Use] : no accessory muscle use [Clear to Auscultation] : lungs were clear to auscultation bilaterally [Normal Rate] : normal rate  [Regular Rhythm] : with a regular rhythm [Normal S1, S2] : normal S1 and S2 [No Murmur] : no murmur heard [Soft] : abdomen soft [Non Tender] : non-tender [Non-distended] : non-distended [No Masses] : no abdominal mass palpated [No HSM] : no HSM [Normal Bowel Sounds] : normal bowel sounds [Normal Supraclavicular Nodes] : no supraclavicular lymphadenopathy [Normal Posterior Cervical Nodes] : no posterior cervical lymphadenopathy [Normal Anterior Cervical Nodes] : no anterior cervical lymphadenopathy [No CVA Tenderness] : no CVA  tenderness [No Spinal Tenderness] : no spinal tenderness [No Joint Swelling] : no joint swelling [Grossly Normal Strength/Tone] : grossly normal strength/tone [No Rash] : no rash [No Skin Lesions] : no skin lesions [Normal Gait] : normal gait [Alert and Oriented x3] : oriented to person, place, and time [de-identified] : right eye lateral sclera hematoma

## 2022-08-12 DIAGNOSIS — Z23 ENCOUNTER FOR IMMUNIZATION: ICD-10-CM

## 2022-08-12 DIAGNOSIS — K59.00 CONSTIPATION, UNSPECIFIED: ICD-10-CM

## 2022-08-12 DIAGNOSIS — E78.5 HYPERLIPIDEMIA, UNSPECIFIED: ICD-10-CM

## 2022-08-12 DIAGNOSIS — R74.01 ELEVATION OF LEVELS OF LIVER TRANSAMINASE LEVELS: ICD-10-CM

## 2022-08-12 DIAGNOSIS — H11.31 CONJUNCTIVAL HEMORRHAGE, RIGHT EYE: ICD-10-CM

## 2022-08-12 DIAGNOSIS — E03.9 HYPOTHYROIDISM, UNSPECIFIED: ICD-10-CM

## 2022-08-12 DIAGNOSIS — E55.9 VITAMIN D DEFICIENCY, UNSPECIFIED: ICD-10-CM

## 2022-08-12 DIAGNOSIS — E11.9 TYPE 2 DIABETES MELLITUS WITHOUT COMPLICATIONS: ICD-10-CM

## 2022-08-30 ENCOUNTER — APPOINTMENT (OUTPATIENT)
Dept: PODIATRY | Facility: CLINIC | Age: 47
End: 2022-08-30

## 2022-09-07 ENCOUNTER — NON-APPOINTMENT (OUTPATIENT)
Age: 47
End: 2022-09-07

## 2022-09-07 ENCOUNTER — APPOINTMENT (OUTPATIENT)
Dept: GASTROENTEROLOGY | Facility: CLINIC | Age: 47
End: 2022-09-07

## 2022-09-07 ENCOUNTER — OUTPATIENT (OUTPATIENT)
Dept: OUTPATIENT SERVICES | Facility: HOSPITAL | Age: 47
LOS: 1 days | Discharge: HOME | End: 2022-09-07

## 2022-09-07 VITALS
WEIGHT: 183 LBS | HEART RATE: 77 BPM | DIASTOLIC BLOOD PRESSURE: 83 MMHG | HEIGHT: 60 IN | SYSTOLIC BLOOD PRESSURE: 143 MMHG | BODY MASS INDEX: 35.93 KG/M2 | OXYGEN SATURATION: 98 % | TEMPERATURE: 98.1 F

## 2022-09-07 DIAGNOSIS — K64.9 UNSPECIFIED HEMORRHOIDS: ICD-10-CM

## 2022-09-07 PROCEDURE — 99204 OFFICE O/P NEW MOD 45 MIN: CPT | Mod: GC

## 2022-09-07 NOTE — HISTORY OF PRESENT ILLNESS
[FreeTextEntry1] : 47yo M with PMH hypothyroidism, constipation, transaminitis,T2D, HLD who presents for colon cancer screening. Referred by his PCP Dr. Gonzalez.\par \par Has never had a colonoscopy before. No family history of cancer. Endorses a small amount of red blood in his stool that occurred prior to his visit with Dr. Gonzalez. It occurs when he strains with BM and blood is on the toilet paper, no the toilet bowl. Has since resolved. Denies heartburn, abdominal pain, constipation, diarrhea. \par

## 2022-09-07 NOTE — ASSESSMENT
[FreeTextEntry1] : #colon cancer screening\par -described the risks and benefits of colonoscopy as well as alternatives. Pt is still agreeable to a colonoscopy\par -ordered golytely and dulcolax prep\par -pt will schedule appointment for colonoscopy at the \par \par #external hemorrhoids\par -pt described bright red blood with BM that occurs while straining. Red blood is on the toilet paper and not in the toilet bowl. Most consistent with hemorrhoids but will r/o other causes with colonoscopy

## 2022-09-07 NOTE — PHYSICAL EXAM
[General Appearance - Alert] : alert [General Appearance - In No Acute Distress] : in no acute distress [General Appearance - Well Nourished] : well nourished [Sclera] : the sclera and conjunctiva were normal [Extraocular Movements] : extraocular movements were intact [] : no respiratory distress [Exaggerated Use Of Accessory Muscles For Inspiration] : no accessory muscle use [Heart Rate And Rhythm] : heart rate was normal and rhythm regular [Heart Sounds] : normal S1 and S2 [Heart Sounds Gallop] : no gallops [Murmurs] : no murmurs [Heart Sounds Pericardial Friction Rub] : no pericardial rub [Bowel Sounds] : normal bowel sounds [Abdomen Soft] : soft [Abdomen Tenderness] : non-tender [Abdomen Mass (___ Cm)] : no abdominal mass palpated

## 2022-09-07 NOTE — REVIEW OF SYSTEMS
[Melena] : melmigdalia [Negative] : Respiratory [Abdominal Pain] : no abdominal pain [Vomiting] : no vomiting [Constipation] : no constipation [Diarrhea] : no diarrhea [Heartburn] : no heartburn

## 2022-09-07 NOTE — END OF VISIT
[] : Resident [FreeTextEntry3] : Pt reports intermittent rectal bleeding mostly when wiping, no blood in stools or melena. No evidence of anemia. Recommend schedule colonoscopy to further assess and pt also due for screening purposes.

## 2022-09-09 DIAGNOSIS — Z12.11 ENCOUNTER FOR SCREENING FOR MALIGNANT NEOPLASM OF COLON: ICD-10-CM

## 2022-09-09 DIAGNOSIS — K64.9 UNSPECIFIED HEMORRHOIDS: ICD-10-CM

## 2022-09-09 DIAGNOSIS — Z00.00 ENCOUNTER FOR GENERAL ADULT MEDICAL EXAMINATION WITHOUT ABNORMAL FINDINGS: ICD-10-CM

## 2022-09-23 LAB
25(OH)D3 SERPL-MCNC: 27 NG/ML
ALBUMIN SERPL ELPH-MCNC: 4.9 G/DL
ALP BLD-CCNC: 83 U/L
ALT SERPL-CCNC: 36 U/L
ANION GAP SERPL CALC-SCNC: 13 MMOL/L
AST SERPL-CCNC: 25 U/L
BASOPHILS # BLD AUTO: 0.03 K/UL
BASOPHILS NFR BLD AUTO: 0.4 %
BILIRUB SERPL-MCNC: 0.9 MG/DL
BUN SERPL-MCNC: 22 MG/DL
CALCIUM SERPL-MCNC: 9.8 MG/DL
CHLORIDE SERPL-SCNC: 103 MMOL/L
CHOLEST SERPL-MCNC: 183 MG/DL
CO2 SERPL-SCNC: 27 MMOL/L
CREAT SERPL-MCNC: 0.9 MG/DL
EGFR: 107 ML/MIN/1.73M2
EOSINOPHIL # BLD AUTO: 0.08 K/UL
EOSINOPHIL NFR BLD AUTO: 1 %
ESTIMATED AVERAGE GLUCOSE: 120 MG/DL
GLUCOSE SERPL-MCNC: 90 MG/DL
HBA1C MFR BLD HPLC: 5.8 %
HCT VFR BLD CALC: 47.4 %
HDLC SERPL-MCNC: 46 MG/DL
HGB BLD-MCNC: 16 G/DL
IMM GRANULOCYTES NFR BLD AUTO: 0.3 %
LDLC SERPL CALC-MCNC: 114 MG/DL
LYMPHOCYTES # BLD AUTO: 3.35 K/UL
LYMPHOCYTES NFR BLD AUTO: 44 %
MAN DIFF?: NORMAL
MCHC RBC-ENTMCNC: 30.8 PG
MCHC RBC-ENTMCNC: 33.8 G/DL
MCV RBC AUTO: 91.3 FL
MONOCYTES # BLD AUTO: 0.48 K/UL
MONOCYTES NFR BLD AUTO: 6.3 %
NEUTROPHILS # BLD AUTO: 3.66 K/UL
NEUTROPHILS NFR BLD AUTO: 48 %
NONHDLC SERPL-MCNC: 137 MG/DL
PLATELET # BLD AUTO: 190 K/UL
POTASSIUM SERPL-SCNC: 4.1 MMOL/L
PROT SERPL-MCNC: 7.4 G/DL
RBC # BLD: 5.19 M/UL
RBC # FLD: 12.3 %
SODIUM SERPL-SCNC: 143 MMOL/L
TRIGL SERPL-MCNC: 117 MG/DL
TSH SERPL-ACNC: 5.14 UIU/ML
WBC # FLD AUTO: 7.62 K/UL

## 2022-09-26 ENCOUNTER — NON-APPOINTMENT (OUTPATIENT)
Age: 47
End: 2022-09-26

## 2022-09-27 ENCOUNTER — APPOINTMENT (OUTPATIENT)
Dept: GASTROENTEROLOGY | Facility: CLINIC | Age: 47
End: 2022-09-27

## 2022-09-27 VITALS
BODY MASS INDEX: 36.32 KG/M2 | HEIGHT: 60 IN | DIASTOLIC BLOOD PRESSURE: 78 MMHG | OXYGEN SATURATION: 98 % | TEMPERATURE: 97.7 F | SYSTOLIC BLOOD PRESSURE: 116 MMHG | HEART RATE: 82 BPM | WEIGHT: 185 LBS

## 2022-09-29 ENCOUNTER — OUTPATIENT (OUTPATIENT)
Dept: OUTPATIENT SERVICES | Facility: HOSPITAL | Age: 47
LOS: 1 days | Discharge: HOME | End: 2022-09-29

## 2022-09-29 ENCOUNTER — APPOINTMENT (OUTPATIENT)
Dept: INTERNAL MEDICINE | Facility: CLINIC | Age: 47
End: 2022-09-29

## 2022-09-29 VITALS
HEIGHT: 60 IN | TEMPERATURE: 97.3 F | BODY MASS INDEX: 35.93 KG/M2 | WEIGHT: 183 LBS | SYSTOLIC BLOOD PRESSURE: 126 MMHG | HEART RATE: 76 BPM | OXYGEN SATURATION: 100 % | DIASTOLIC BLOOD PRESSURE: 81 MMHG

## 2022-09-29 PROCEDURE — 99214 OFFICE O/P EST MOD 30 MIN: CPT | Mod: 25,GC

## 2022-09-29 RX ORDER — POLYETHYLENE GLYCOL 3350 AND ELECTROLYTES WITH LEMON FLAVOR 236; 22.74; 6.74; 5.86; 2.97 G/4L; G/4L; G/4L; G/4L; G/4L
236 POWDER, FOR SOLUTION ORAL
Qty: 1 | Refills: 0 | Status: DISCONTINUED | COMMUNITY
Start: 2022-09-07 | End: 2022-09-29

## 2022-09-29 RX ORDER — LEVOTHYROXINE SODIUM 0.05 MG/1
50 TABLET ORAL
Qty: 30 | Refills: 5 | Status: DISCONTINUED | COMMUNITY
Start: 2022-06-23 | End: 2022-09-29

## 2022-09-29 RX ORDER — BISACODYL 5 MG
5 TABLET, DELAYED RELEASE (ENTERIC COATED) ORAL
Qty: 4 | Refills: 0 | Status: DISCONTINUED | COMMUNITY
Start: 2022-09-07 | End: 2022-09-29

## 2022-09-29 NOTE — HISTORY OF PRESENT ILLNESS
[FreeTextEntry1] : follow up [de-identified] : 46M with PMH of hypothyroidism, constipation, transaminitis, T2D, HLD presents for followup. A1C down to 5.8. TSH elevated to 5.74. Following strict diet and compliant with all meds. LDL elevated to 119

## 2022-09-29 NOTE — PLAN
[FreeTextEntry1] : 46M with PMH of hypothyroidism, constipation, transaminitis, T2D, HLD presents for followup. \par \par #Hypothyroidism\par - Increase levothyroxine to 75 mcg daily\par \par #T2D\par - Hb A1C 5.8\par - diet and lifestyle modification\par - c/w Metformin 500mg BID \par - saw ophthal and podiatry (July 2022)\par \par #Transaminitis\par - RUQ US completed July 2022; report was normal \par - resolved\par \par #HLD \par -  \par - Diet and lifestyle modification advised\par \par # HCM\par - COVID Vaccine x3 Moderna \par - Tdap Vaccine UTD\par - Flu vaccine today\par - Screening colonoscopy scheduled for October 2022\par - Repeat labs prior to 6 mo f/u\par \par RTC in 6 months after labs

## 2022-09-29 NOTE — PHYSICAL EXAM
[No Acute Distress] : no acute distress [Normal Sclera/Conjunctiva] : normal sclera/conjunctiva [Normal Outer Ear/Nose] : the outer ears and nose were normal in appearance [No JVD] : no jugular venous distention [No Respiratory Distress] : no respiratory distress  [Normal Rate] : normal rate  [No Edema] : there was no peripheral edema [Soft] : abdomen soft [Grossly Normal Strength/Tone] : grossly normal strength/tone [Coordination Grossly Intact] : coordination grossly intact [Normal Insight/Judgement] : insight and judgment were intact

## 2022-09-30 DIAGNOSIS — E03.9 HYPOTHYROIDISM, UNSPECIFIED: ICD-10-CM

## 2022-09-30 DIAGNOSIS — E55.9 VITAMIN D DEFICIENCY, UNSPECIFIED: ICD-10-CM

## 2022-09-30 DIAGNOSIS — R74.01 ELEVATION OF LEVELS OF LIVER TRANSAMINASE LEVELS: ICD-10-CM

## 2022-09-30 DIAGNOSIS — Z23 ENCOUNTER FOR IMMUNIZATION: ICD-10-CM

## 2022-09-30 DIAGNOSIS — E11.9 TYPE 2 DIABETES MELLITUS WITHOUT COMPLICATIONS: ICD-10-CM

## 2022-09-30 DIAGNOSIS — E78.5 HYPERLIPIDEMIA, UNSPECIFIED: ICD-10-CM

## 2022-10-03 ENCOUNTER — LABORATORY RESULT (OUTPATIENT)
Age: 47
End: 2022-10-03

## 2022-10-06 ENCOUNTER — OUTPATIENT (OUTPATIENT)
Dept: OUTPATIENT SERVICES | Facility: HOSPITAL | Age: 47
LOS: 1 days | Discharge: HOME | End: 2022-10-06

## 2022-10-06 ENCOUNTER — TRANSCRIPTION ENCOUNTER (OUTPATIENT)
Age: 47
End: 2022-10-06

## 2022-10-06 ENCOUNTER — RESULT REVIEW (OUTPATIENT)
Age: 47
End: 2022-10-06

## 2022-10-06 VITALS — SYSTOLIC BLOOD PRESSURE: 107 MMHG | HEART RATE: 68 BPM | DIASTOLIC BLOOD PRESSURE: 65 MMHG | RESPIRATION RATE: 18 BRPM

## 2022-10-06 VITALS
HEART RATE: 67 BPM | SYSTOLIC BLOOD PRESSURE: 121 MMHG | WEIGHT: 173.94 LBS | TEMPERATURE: 98 F | DIASTOLIC BLOOD PRESSURE: 78 MMHG | RESPIRATION RATE: 18 BRPM | HEIGHT: 66 IN

## 2022-10-06 PROCEDURE — 88305 TISSUE EXAM BY PATHOLOGIST: CPT | Mod: 26

## 2022-10-06 PROCEDURE — 45380 COLONOSCOPY AND BIOPSY: CPT

## 2022-10-06 RX ORDER — LEVOTHYROXINE SODIUM 125 MCG
1 TABLET ORAL
Qty: 0 | Refills: 0 | DISCHARGE

## 2022-10-06 RX ORDER — METFORMIN HYDROCHLORIDE 850 MG/1
1 TABLET ORAL
Qty: 0 | Refills: 0 | DISCHARGE

## 2022-10-06 NOTE — ASU DISCHARGE PLAN (ADULT/PEDIATRIC) - NS MD DC FALL RISK RISK
For information on Fall & Injury Prevention, visit: [email or web address] OR  [email or web address] OR  https://www.cdc.gov/steadi/patient.html

## 2022-10-06 NOTE — H&P PST ADULT - HISTORY OF PRESENT ILLNESS
47yo M with PMH hypothyroidism, constipation, transaminitis,T2D, HLD who presents for colon cancer screening.

## 2022-10-06 NOTE — H&P PST ADULT - ASSESSMENT
45yo M with PMH hypothyroidism, constipation, transaminitis,T2D, HLD who presents for colon cancer screening.

## 2022-10-06 NOTE — ASU PREOP CHECKLIST - HEIGHT IN INCHES
From: Lenore Mcgee  To: Valeria Vick MD  Sent: 12/26/2019 8:41 AM CST  Subject: Non-Urgent Medical Question    Good morning doc. Woke up yesterday all stuffy in the head.  By now I am pretty sure I have a bad sinus infection, and it is starting to move 6

## 2022-10-06 NOTE — H&P PST ADULT - CONSTITUTIONAL
[facility] RADIATION ONCOLOGY  [address] [address]  Dept Phone: [phone]    Radiation Oncology Consult    Patient Name:  Justine Servin  YOB: 1963  MRN:  [id number]  Account Number:  [id number]  Referring Physician:  Ck Temple MD  Dictating Physician:  Ilana Sullivan MD    Diagnosis:  Malignant neoplasm of upper-outer quadrant of right breast in female, estrogen receptor positive (CMS/HCC) C50.411, Z17.0    Cancer Staging  Malignant neoplasm of upper-outer quadrant of right breast in female, estrogen receptor positive (CMS/HCC)  Staging form: Breast, AJCC 8th Edition  - Pathologic stage from 4/8/2021: Stage IA (pT1c, pN0(sn), cM0, G2, ER+, WV+, HER2-, Oncotype DX score: 19) - Signed by Kenny Toure MD on 5/8/2021      Reason for Consultation:  Patient seen for consideration of adjuvant radiation therapy for Stage I infiltrating lobular carcinoma of the right breast.    Narrative:  Justine Servin is a 57 year old female with an anatomic and clinical prognostic stage Ia invasive lobular carcinoma of the right breast; grade 2, ER positive (80%), WV positive (30%), HER-2 nonamplified, Ki-67 5%, Oncotype score of 19 with negative genetic profile for deleterious mutation .     Gynecologic History:    G 3 P 3, menarche at age 16, menopause at age 55, had 12 years of HRT after menopause.     Past Medical History:   Diagnosis Date   • Breast cancer (CMS/HCC)    • Bulging of lumbar intervertebral disc    • Elevated glucose    • Essential (primary) hypertension    • Genital herpes    • Multiple nevi    • Plantar fasciitis    • Sciatica, left side     mild, worse when sitting   • UTI (urinary tract infection)    • Vitamin D deficiency        Past Surgical History:   Procedure Laterality Date   • Breast lumpectomy Right 04/13/2021    w/ SN biopsy   • Colonoscopy     • Dexa bone density axial skeleton  12/17/2012    wnl   • Mammo screening bilateral  12/17/2012   • Pap smear,  thin prep  11/01/2012    wnl        Current Outpatient Medications   Medication Sig Dispense Refill   • lisinopril (ZESTRIL) 30 MG tablet TAKE 1 TABLET BY MOUTH EVERY DAY 30 tablet 2   • acetaminophen (TYLENOL) 325 MG tablet Take 650 mg by mouth every 4 hours as needed for Pain.     • Yuvafem 10 MCG vaginal tablet USE ONE INSERT EVERY NIGHT FOR 1 WEEK THEN DECREASE TO 2 NIGHTS A WEEK 32 tablet 2   • halobetasol (ULTRAVATE) 0.05 % cream as needed.      • Probiotic Product (PROBIOTIC PO)      • MAGNESIUM OXIDE PO      • B Complex Vitamins (VITAMIN B COMPLEX PO)      • Ascorbic Acid (VITAMIN C PO)      • turmeric 500 MG capsule      • anastrozole (ARIMIDEX) 1 MG tablet Take 1 tablet by mouth daily. 0 (Patient taking differently: Take 1 mg by mouth daily. Prescribed by Dr. Carney but will not start until after RT) 30 tablet 3     No current facility-administered medications for this encounter.       Current Outpatient Medications   Medication Sig   • lisinopril (ZESTRIL) 30 MG tablet TAKE 1 TABLET BY MOUTH EVERY DAY   • acetaminophen (TYLENOL) 325 MG tablet Take 650 mg by mouth every 4 hours as needed for Pain.   • Yuvafem 10 MCG vaginal tablet USE ONE INSERT EVERY NIGHT FOR 1 WEEK THEN DECREASE TO 2 NIGHTS A WEEK   • halobetasol (ULTRAVATE) 0.05 % cream as needed.    • Probiotic Product (PROBIOTIC PO)    • MAGNESIUM OXIDE PO    • B Complex Vitamins (VITAMIN B COMPLEX PO)    • Ascorbic Acid (VITAMIN C PO)    • turmeric 500 MG capsule    • anastrozole (ARIMIDEX) 1 MG tablet Take 1 tablet by mouth daily. 0 (Patient taking differently: Take 1 mg by mouth daily. Prescribed by Dr. Carney but will not start until after RT)     No current facility-administered medications for this encounter.       Allergies as of 05/11/2021   • (No Known Allergies)       Social History:   Tobacco Use: Justine Servin  reports that she quit smoking about 26 years ago. She has never used smokeless tobacco.   Alcohol Use: Justine Servin  reports  current alcohol use.   Drug Use: Justine Servin  reports no history of drug use.  Resides In: [address] [place] [id number]  Martial Status:  /Civil Union [2]  Number of Children:    Occupation:    Contact Information:  [phone] (home)     Family History   Problem Relation Age of Onset   • Hyperlipidemia Mother    • Depression Father         Review of Systems:    Review of Systems   Constitutional: Negative for appetite change, chills, fatigue, fever and unexpected weight change.   HENT:   Negative for lump/mass.    Respiratory: Negative for cough, shortness of breath and wheezing.    Cardiovascular: Negative for chest pain and palpitations.   Gastrointestinal: Negative for abdominal pain, blood in stool, constipation, diarrhea, nausea and vomiting.   Genitourinary: Negative for difficulty urinating, dysuria and hematuria.    Musculoskeletal: Positive for back pain.   Skin: Negative for rash and wound.   Neurological: Negative for dizziness, light-headedness, seizures and speech difficulty.   Psychiatric/Behavioral: Positive for depression. The patient is not nervous/anxious.         HISTORY OF PRESENT ILLNESS:  The patient obtained a routine bilateral mammogram on February 1, 2021.  This revealed a 1.4 x 1.0 cm of oval density at the 11 o'clock position 5.5 cm posterior from the nipple.  Due to the suspicious nature of this lesion a diagnostic mammogram of the right breast with Marc was obtained on 2/24/2021.    That  This confirmed there was an area of architectural distortion in the right breast at 11:00 middle depth.  A subsequent biopsy on 3/2/2021 revealed an invasive lobular carcinoma, grade 2 ER positive at 80% UT positive at 90%, HER-2 nonamplified with a Ki-67 of 5%.A subsequent MRI of the breast on March 8, 2021 confirmed the presence of a 1.5 cm irregular enhancing mass in the upper outer posterior right breast consistent with the known malignancy.  There are numerous scattered  enhancing foci and areas of enhancement in the left breast with attention to the 9 mm span of nonmass enhancing the upper slightly outer left middle depth.  No evidence of noted adenopathy.    The patient subsequently underwent a lumpectomy and sentinel node biopsy on 4/8/2021.  The final pathology documents a 17 mm, grade 2 invasive lobular carcinoma of the right breast.  Margins are negative.  2 lymph nodes were sampled and both showed no evidence of metastatic disease.  The tumor is ER positive IL positive, HER-2 nonamplified with a Ki-67 of 5% and an Oncotype score of 19.  The final pathology is of a hL4uyW0 M0 invasive lobular carcinoma of the right breast with a lobular carcinoma in situ component, classic type.    The patient presents for consideration of external beam radiotherapy treatments.    ECOG Performance Status: 0-Fully active, able to carry on all pre-disease performance without restriction    Vitals:    05/11/21 1439   BP: (!) 144/88   Pulse: 65   Temp: 98 °F (36.7 °C)   SpO2: 97%   Weight: 83 kg (182 lb 15.7 oz)   PainSc:  0     Body mass index is 26.26 kg/m².     Physical Exam  Vitals and nursing note reviewed.   Constitutional:       Appearance: Normal appearance. She is normal weight.   HENT:      Head: Normocephalic and atraumatic.      Right Ear: Tympanic membrane normal.      Left Ear: Tympanic membrane normal.      Nose: Nose normal.      Mouth/Throat:      Mouth: Mucous membranes are dry.      Pharynx: Oropharynx is clear.   Eyes:      Extraocular Movements: Extraocular movements intact.      Pupils: Pupils are equal, round, and reactive to light.   Cardiovascular:      Rate and Rhythm: Normal rate and regular rhythm.      Pulses: Normal pulses.      Heart sounds: Normal heart sounds.   Pulmonary:      Effort: Pulmonary effort is normal.      Breath sounds: Normal breath sounds.   Chest:          Comments: Well-healed surgical scar: Scar in the right breast with an area of fibrosis  measuring 4 x 3.5 cm.  The patient does complain of pain and sensitivity in the right breast.  There is no cervical, supraclavicular, infraclavicular or axillary adenopathy.  Abdominal:      General: Abdomen is flat. Bowel sounds are normal.      Palpations: Abdomen is soft.   Musculoskeletal:         General: Normal range of motion.      Cervical back: Normal range of motion and neck supple.   Skin:     General: Skin is dry.   Neurological:      General: No focal deficit present.      Mental Status: She is alert and oriented to person, place, and time. Mental status is at baseline.   Psychiatric:         Mood and Affect: Mood normal.         Behavior: Behavior normal.         Thought Content: Thought content normal.         Judgment: Judgment normal.          Imaging: I personally reviewed the patient’s diagnostic images.  The findings are as described above in the HPI    EKG:  No results found for this or any previous visit (from the past 4464 hour(s)).    Echo/Echo Stress:  No procedure found.    Cath:  No procedure found.    MRI:  03/24/21   MRI BREAST BIOPSY 1 LESION RIGHT  Narrative  #[id number] - MRI BREAST BIOPSY 1 LESION RIGHT#[id number] - MAMMO POST GUIDED PROCEDURE DIAGNOSTIC RIGHTUNILATERAL RIGHT DIAGNOSTIC MAMMOGRAM: 3/24/2021CLINICAL HISTORY:Post MRI guided biopsy images of right breast.  COMPARISON: Comparison is made to exams dated:  3/2/2021 mammogram, 2/24/2021 mammogram, and 2/1/2021 mammogram - [facility].  FINDINGS:There is a marker clip in the appropriate position in the right breast central to the nipple middle depth.     Impression  MAMMOGRAPHY  POST BIOPSY MARKERThere was a successful marker clip placement in the right breast central to the nipple middle depth.  MRI BIOPSY RIGHT BREAST USING VACUUM DEVICE WITH MARKING DEVICE INSERTED: 3/24/2021COMPARISON: Comparison is made to exams dated:  3/2/2021 mammogram, 2/24/2021 mammogram, and 2/1/2021 mammogram - [facility]  [facility].  Axial and sagittal T1 and T2 images were obtained with a breast MRI.  PROCEDURE:An MRI biopsy was performed for the concerning abnormality located in the right breast central to the nipple middle depth.  The skin was prepped in the usual manner.  Local anesthetic was administered to the access site.  The abnormality was approached from the lateral aspect.  A biopsy needle was placed adjacent to the abnormality under MRI guidance.  Additional MRI images were obtained to document needle placement.  Once the needle was documented to be in the correct location, multiple specimens were obtained using a vacuum assisted device.  A Barrel clip was inserted into the biopsy cavity.  Post procedure imaging demonstrates the location device at the targeted area.  The specimens were sent to the laboratory for pathological analysis.  IMPRESSION: MRI BIOPSY BENIGN MRI biopsy of the abnormality in the right breast central to the nipple middle depth was performed.  Pathology indicates benign finding.  Pathology results are concordant with imaging findings.  The mass in the lower central right breast could not be biopsied under MRI guidance due to far posterior location. Surgical management is recommended. MAMMOGRAPHY BI-RADS: Post biopsy marker    Electronically Signed by: Denny Crum M.D.          sc/:3/26/2021 12:35:34  Imaging Technologist: Ruthann Contreras, [facility]; RT Fabian(R)(M), [facility]    03/08/21   MRI BREAST DIAGNOSTIC BILATERAL W WO CONTRAST  Narrative  #[id number] - MRI BREAST DIAGNOSTIC BILATERAL W WO CONTRASTBREAST MRI OF BOTH BREASTS: 3/8/2021COMPARISON: Comparison is made to exams dated:  3/2/2021 mammogram, 3/2/2021 ultrasound biopsy, 2/24/2021 ultrasound, and 2/24/2021 mammogram - [facility].  TECHNIQUE: Interpretation of this MRI was correlated with available ultrasounds and clinical information.  10 cc of Gadavist contrast was injected.  Axial  T1, T2, sagittal T1, pre and post contrast T1, and coronal images were obtained.  Examination includes pre-contrast and post-contrast imaging in the axial plane at 1, 2, 3, 4, and 5 minutes.  Post processing was performed including computer generated subtraction and color parametric mapping.  FINDINGS: The tissue of both breasts is heterogeneously fibroglandular tissue.  Bilateral background breast enhancement is moderate.  Right breast: 1.5 cm irregular enhancing mass in the upper outer posterior right breast is consistent with known malignancy. Numerous scattered enhancing foci and areas of enhancement elsewhere in the right breast with attention to 5 mm mass in the central right breast 4.7 cm posterior to nipple demonstrating plateau delayed phase kinetics(series 95297 image 108) and 5 mm rim enhancing mass at 6:00, 5.4 cm posterior to nipple demonstrating washout delayed phase kinetics(series 56834 image 153). Visualized axilla is unremarkable. Left breast: Numerous scattered enhancing foci and areas of enhancement in the left breast with attention to 9 mm span of non-mass enhancement in the upper slightly outer left breast middle depth demonstrating mixed delayed phase kinetics(series 44981 image 100) and 5 mm enhancing mass in lower slightly inner left breast middle depth demonstrating washout delayed phase kinetics(series 68401 image 136). Visualized axilla is unremarkable. Extramammary findings: None.    Impression  SUSPICIOUS OF MALIGNANCY 1.5 cm irregular enhancing mass in the upper outer posterior right breast is consistent with known malignancy. Surgical management is recommended. Numerous scattered enhancing foci and areas of enhancement elsewhere in the right breast with attention to 5 mm mass in the central right breast 4.7 cm posterior to nipple demonstrating plateau delayed phase kinetics(series 67448 image 108) and 5 mm rim enhancing mass at 6:00, 5.4 cm posterior to nipple demonstrating washout  delayed phase kinetics(series 68105 image 153). Numerous scattered enhancing foci and areas of enhancement in the left breast with attention to 9 mm span of non-mass enhancement in the upper slightly outer left breast middle depth demonstrating mixed delayed phase kinetics(series 34130 image 100) and 5 mm enhancing mass in lower slightly inner left breast middle depth demonstrating washout delayed phase kinetics(series 17939 image 136). Given lobular histology of malignancy, a breast whole breast second look ultrasound with possible biopsy is recommended. If there are no suspicious ultrasound correlate, an MRI biopsy/biopsies should be considered. Of note, prior outside ultrasoud report mentions suspicious mass at 9:00 for which there is no documented sonographic mass. Dr. Temple notified at time of dictation.  MRI BI-RADS: 4 Suspicious for malignancy   Electronically Signed by: Denny Vallejo M.D.  sc/jolenerad:3/9/2021 11:49:48  Imaging Technologist: RT Terrie(R), [facility]    CT:  No results found for this or any previous visit.    X-ray:  No results found for this or any previous visit.    Nuclear Medicine:  04/08/21   NM SENTINEL NODE INJECTION ONLY  Narrative  EXAM: Nuclear medicine sentinel node injectionCLINICAL INDICATION: Right breast cancer.  Impression  TECHNIQUE, FINDINGS AND IMPRESSION: 525 uCi of technetium 99m lymphoseekwas injected in split doses around the right breast areola by the trainednuclear medicine technologist.  A radiologist was not present.No imaging was performed per request of the referring surgeon. The patientwas subsequently transferred to the operating room for dissection of thesentinel lymph node.Electronically Signed by: DENNY VALLEJO M.D. Signed on: 4/8/2021 8:16 AM     03/31/21   MAMMO MOLECULAR BREAST IMAGING (MBI)  Narrative  #[id number] - MAMMO MOLECULAR BREAST IMAGING (MBI)SCINTIGRAPHY (BSGI) OF BOTH BREASTS : 3/31/2021CLINICAL HISTORY:Asymptomatic high  risk surveillance (genetic or remote history of breast neoplasm).  COMPARISON: Comparison is made to exams dated:  3/24/2021 mammogram, 3/24/2021 MRI biopsy, 3/16/2021 ultrasound, 3/8/2021 breast MRI - [facility], 3/2/2021 mammogram, and 3/2/2021 ultrasound biopsy - [facility].  Patient received 8.2 millicuries of radionuclide intravenously.  Post injection bilateral craniocaudal and mediolateral oblique views were obtained with a gamma camera.  In conjunction with negative MBI findings and extensive nature of nodular areas of enhancement bilateral breasts on MRI of 3/8/2021, biopsy deferral advised. Post treatment follow up MRI in 6 months time advised.    Impression  BENIGN In conjunction with negative MBI findings and extensive nature of nodular areas of enhancement bilateral breasts on MRI of 3/8/2021, biopsy deferral advised. Post treatment follow up MRI in 6 months time advised.  Procedure was successful.  There is no concerning uptake in either breast.  Dr Temple notified of findings and recommendations at time of dictation on 3/31/2021 at 2:15PM.Electronically Signed by: Daniel Aguayo M.D.  kk/:3/31/2021 14:22:30  Imaging Technologist: KAREN Barker (R)), [facility] BI-RADS: 2 Benign  No results found for this or any previous visit.    Stress Testing:  No procedure found.    Mammo:  04/08/21   MAMMO BREAST SPECIMEN RIGHT  Narrative  #[id number] - MAMMO BREAST SPECIMEN RIGHTSPECIMEN: 4/8/2021CLINICAL HISTORY:Malignant Pathology.  COMPARISON:PROCEDURE:  Impression  SPECIMENSpecimen radiograph submitted for interpretation demonstrates Saviscout radiotransmitter to be within resected specimen along with microclip. Findings were communicated to the OR at time of dictation.Electronically Signed by: Daniel Aguayo M.D.  kk/:4/8/2021 08:44:33  Imaging Technologist: BETINA Barker)(MAN), [facility]    04/01/21   MAMMO POST GUIDED PROCEDURE  DIAGOSTIC RIGHT  Narrative  #[id number] - US BREAST WIRELESS LOCALIZATION RIGHT 1 LESION#[id number] - MAMMO POST GUIDED PROCEDURE DIAGNOSTIC RIGHTMAMMOGRAPHY GUIDED INFRARED ACTIVATED ELECTROMAGNETIC REFLECTOR DEVICE PLACEMENT RIGHT BREAST WITH POST MAMMOGRAPHIC IMAGINLINICAL HISTORY:Abbey  placementCovid vaccine - YesPfizer;3/12/21 Left arm; Within the past 3 weeks. post procedure right breast.  PATIENT CONSENT: After describing the procedure with its risks, benefits, and potential complications, the patient's questions were answered and written informed consent was obtained.  COMPARISON: Comparison is made to exams dated:  3/16/2021 ultrasound - [facility], 2021 ultrasound, and 3/2/2021 [place] [facility]  PROCEDURE:An infrared activated electromagnetic reflector device placement using mammography guidance was performed for the mass located in the right breast at 10 o'clock middle depth.  This was described on the previous mammography report.  The skin was prepped in the usual manner.  Local anesthetic was administered to the access site.  A location device was inserted into the targeted area under mammography guidance.  Post placement mammographic imaging demonstrates location device traverses the targeted area.    Impression  INFRARED ACTIVATED ELECTROMAGNETIC REFLECTOR DEVICE PLACEMENTInfrared activated electromagnetic reflector device placement for the mass in the right breast at 10 o'clock middle depth was performed.  UNILATERAL RIGHT DIAGNOSTIC MAMMOGRAM: OMPARISON: Comparison is made to exams dated:  3/16/2021 ultrasound - [facility], 2021 ultrasound, and 3/2/2021 ultrasound biopsy - [facility].  FINDINGS:The tissue of right breast is heterogeneously dense. This may lower the sensitivity of mammography.  There is a marker clip in the appropriate position in the right breast at 11 o'clock middle depth.   This marker clip placement is at the biopsy site.   IMPRESSION: MAMMOGRAPHY  POST BIOPSY MARKERThere was a successful marker clip placement in the right breast middle depth.  MAMMOGRAPHY BI-RADS: Post biopsy marker    Electronically Signed by: Daniel neri/jesse:4/1/2021 13:59:42  Imaging Technologist: Yumi Romo, [facility]; Ruthann Gan, RT(R)(M), [facility]    03/24/21   MAMMO POST GUIDED PROCEDURE DIAGOSTIC RIGHT  Narrative  #[id number] - MRI BREAST BIOPSY 1 LESION RIGHT#[id number] - MAMMO POST GUIDED PROCEDURE DIAGNOSTIC RIGHTUNILATERAL RIGHT DIAGNOSTIC MAMMOGRAM: 3/24/2021CLINICAL HISTORY:Post MRI guided biopsy images of right breast.  COMPARISON: Comparison is made to exams dated:  3/2/2021 mammogram, 2/24/2021 mammogram, and 2/1/2021 mammogram - [facility].  FINDINGS:There is a marker clip in the appropriate position in the right breast central to the nipple middle depth.     Impression  MAMMOGRAPHY  POST BIOPSY MARKERThere was a successful marker clip placement in the right breast central to the nipple middle depth.  MRI BIOPSY RIGHT BREAST USING VACUUM DEVICE WITH MARKING DEVICE INSERTED: 3/24/2021COMPARISON: Comparison is made to exams dated:  3/2/2021 mammogram, 2/24/2021 mammogram, and 2/1/2021 mammogram - [facility].  Axial and sagittal T1 and T2 images were obtained with a breast MRI.  PROCEDURE:An MRI biopsy was performed for the concerning abnormality located in the right breast central to the nipple middle depth.  The skin was prepped in the usual manner.  Local anesthetic was administered to the access site.  The abnormality was approached from the lateral aspect.  A biopsy needle was placed adjacent to the abnormality under MRI guidance.  Additional MRI images were obtained to document needle placement.  Once the needle was documented to be in the correct location, multiple specimens were obtained using a vacuum  assisted device.  A Barrel clip was inserted into the biopsy cavity.  Post procedure imaging demonstrates the location device at the targeted area.  The specimens were sent to the laboratory for pathological analysis.  IMPRESSION: MRI BIOPSY BENIGN MRI biopsy of the abnormality in the right breast central to the nipple middle depth was performed.  Pathology indicates benign finding.  Pathology results are concordant with imaging findings.  The mass in the lower central right breast could not be biopsied under MRI guidance due to far posterior location. Surgical management is recommended. MAMMOGRAPHY BI-RADS: Post biopsy marker    Electronically Signed by: Denny Crum M.D.          sc/:3/26/2021 12:35:34  Imaging Technologist: Ruthann Contreras, [facility]; TR PeraltaR)(M), [facility]    Mammo:  21   MAMMO BREAST SPECIMEN RIGHT  Narrative  #[id number] - MAMMO BREAST SPECIMEN RIGHTSPECIMEN: LINICAL HISTORY:Malignant Pathology.  COMPARISON:PROCEDURE:  Impression  SPECIMENSpecimen radiograph submitted for interpretation demonstrates Saviscout radiotransmitter to be within resected specimen along with microclip. Findings were communicated to the OR at time of dictation.Electronically Signed by: Daniel Aguayo M.D.  kk/:2021 08:44:33  Imaging Technologist: RT Lucero(R)(M), [facility]    21   MAMMO POST GUIDED PROCEDURE DIAGOSTIC RIGHT  Narrative  #[id number] - US BREAST WIRELESS LOCALIZATION RIGHT 1 LESION#[id number] - MAMMO POST GUIDED PROCEDURE DIAGNOSTIC RIGHTMAMMOGRAPHY GUIDED INFRARED ACTIVATED ELECTROMAGNETIC REFLECTOR DEVICE PLACEMENT RIGHT BREAST WITH POST MAMMOGRAPHIC IMAGINLINICAL HISTORY:Abbey  placementCovid vaccine - YesPfizer;3/12/21 Left arm; Within the past 3 weeks. post procedure right breast.  PATIENT CONSENT: After describing the procedure with its risks, benefits, and potential complications, the  patient's questions were answered and written informed consent was obtained.  COMPARISON: Comparison is made to exams dated:  3/16/2021 ultrasound - [facility], 2/24/2021 ultrasound, and 3/2/2021 ultrasound biopsy - [facility].  PROCEDURE:An infrared activated electromagnetic reflector device placement using mammography guidance was performed for the mass located in the right breast at 10 o'clock middle depth.  This was described on the previous mammography report.  The skin was prepped in the usual manner.  Local anesthetic was administered to the access site.  A location device was inserted into the targeted area under mammography guidance.  Post placement mammographic imaging demonstrates location device traverses the targeted area.    Impression  INFRARED ACTIVATED ELECTROMAGNETIC REFLECTOR DEVICE PLACEMENTInfrared activated electromagnetic reflector device placement for the mass in the right breast at 10 o'clock middle depth was performed.  UNILATERAL RIGHT DIAGNOSTIC MAMMOGRAM: 4/1/2021COMPARISON: Comparison is made to exams dated:  3/16/2021 ultrasound - [facility], 2/24/2021 ultrasound, and 3/2/2021 ultrasound biopsy - [facility].  FINDINGS:The tissue of right breast is heterogeneously dense. This may lower the sensitivity of mammography.  There is a marker clip in the appropriate position in the right breast at 11 o'clock middle depth.  This marker clip placement is at the biopsy site.   IMPRESSION: MAMMOGRAPHY  POST BIOPSY MARKERThere was a successful marker clip placement in the right breast middle depth.  MAMMOGRAPHY BI-RADS: Post biopsy marker    Electronically Signed by: Daniel neri/jesse:4/1/2021 13:59:42  Imaging Technologist: Yumi Romo, [facility]; Ruthann Gan RT(R)(M), [facility]    03/24/21   MAMMO POST GUIDED PROCEDURE DIAGOSTIC RIGHT  Narrative  #[id number] - MRI BREAST BIOPSY 1 LESION  RIGHT#[id number] - MAMMO POST GUIDED PROCEDURE DIAGNOSTIC RIGHTUNILATERAL RIGHT DIAGNOSTIC MAMMOGRAM: 3/24/2021CLINICAL HISTORY:Post MRI guided biopsy images of right breast.  COMPARISON: Comparison is made to exams dated:  3/2/2021 mammogram, 2/24/2021 mammogram, and 2/1/2021 mammogram - [facility].  FINDINGS:There is a marker clip in the appropriate position in the right breast central to the nipple middle depth.     Impression  MAMMOGRAPHY  POST BIOPSY MARKERThere was a successful marker clip placement in the right breast central to the nipple middle depth.  MRI BIOPSY RIGHT BREAST USING VACUUM DEVICE WITH MARKING DEVICE INSERTED: 3/24/2021COMPARISON: Comparison is made to exams dated:  3/2/2021 mammogram, 2/24/2021 mammogram, and 2/1/2021 mammogram - [facility].  Axial and sagittal T1 and T2 images were obtained with a breast MRI.  PROCEDURE:An MRI biopsy was performed for the concerning abnormality located in the right breast central to the nipple middle depth.  The skin was prepped in the usual manner.  Local anesthetic was administered to the access site.  The abnormality was approached from the lateral aspect.  A biopsy needle was placed adjacent to the abnormality under MRI guidance.  Additional MRI images were obtained to document needle placement.  Once the needle was documented to be in the correct location, multiple specimens were obtained using a vacuum assisted device.  A Barrel clip was inserted into the biopsy cavity.  Post procedure imaging demonstrates the location device at the targeted area.  The specimens were sent to the laboratory for pathological analysis.  IMPRESSION: MRI BIOPSY BENIGN MRI biopsy of the abnormality in the right breast central to the nipple middle depth was performed.  Pathology indicates benign finding.  Pathology results are concordant with imaging findings.  The mass in the lower central right breast could not be biopsied under MRI guidance  due to far posterior location. Surgical management is recommended. MAMMOGRAPHY BI-RADS: Post biopsy marker    Electronically Signed by: Denny Crum M.D.          sc/:3/26/2021 12:35:34  Imaging Technologist: Ruthann Contreras, [facility]; RT Fabian(R)(M), [facility]    Impression:  Justine is a 57 year old year old female with an anatomic and clinical prognostic stage Ia invasive lobular carcinoma of the right breast; grade 2, ER positive (80%), OR positive (30%), HER-2 nonamplified, Ki-67 5%, Oncotype score of 19 with negative genetic profile for deleterious mutation .     Plan:  We had a long and detailed discussion with the patient regarding Justine's diagnosis, recent radiologic and pathologic study results, and various therapeutic options. I explained to the patient that she appears to have clinical prognostic stage Ia (pT1 cpN0 M0) invasive lobular carcinoma of the right breast. We then outlined the rationale and long-term outcome data of radiation treatment, including the possible associated side effect profile. We specifically described in detail the use of an 3D conformal radiation technique, and the need for daily fractionated treatment for a period approaching fourx weeks.      We discussed that her treatment course will begin with a CT scan for simulation purposes.  The region of the thoracic region, breast, thoracic structures will be outlined.  After 3-dimensional treatment plan is performed the patient will be treated to a total of 4256 cGy in 16 fractions with a subsequent 10 Gray boost to the tumor bed.    The benefits of radiotherapy include local control the tumor and the side effects include skin erythema, dry desquamation, moist desquamation, skin necrosis, damage to the heart, lungs, intrathoracic structures.  She understands the side effects include but are not limited to the above mentioned.    The patient understands that these recommendations are consistent with  the current NCCN and RTOG guidelines.    The patient asked a multitude of pertinent questions which were answered to their apparent satisfaction. Justine stated that they would like to proceed with radiation therapy and consent was obtained and a CT simulation was scheduled. Justine   is free to contact our office with any questions or concerns at any time.    Total time spent with patient:  >80 minutes.     >50% of the total time was spent counseling and/or coordinating care.   normal/well-groomed/no distress

## 2022-10-06 NOTE — CHART NOTE - NSCHARTNOTEFT_GEN_A_CORE
PACU ANESTHESIA ADMISSION NOTE      Procedure: Colonoscopy  Post op diagnosis:      ____  Intubated  TV:______       Rate: ______      FiO2: ______    _X___  Patent Airway    __X__  Full return of protective reflexes    ____  Full recovery from anesthesia / back to baseline status    Vitals:  T: 97.9F  HR: 72  BP: 103/55  RR: 17  SpO2: --99%    Mental Status:  __X__ Awake   _____ Alert   _____ Drowsy   _____ Sedated    Nausea/Vomiting:  __X__ NO  ______Yes,   See Post - Op Orders          Pain Scale (0-10):  _____    Treatment: ____ None    __X__ See Post - Op/PCA Orders    Post - Operative Fluids:   ____ Oral   _X___ See Post - Op Orders    Plan: Discharge:   __X__Home       _____Floor     _____Critical Care    _____  Other:_________________    Comments: NO anesthetic related complications noted. pt. transported to PACU, report endorsed to RN

## 2022-10-06 NOTE — ASU DISCHARGE PLAN (ADULT/PEDIATRIC) - CARE PROVIDER_API CALL
Marisel Regalado)  Gastroenterology; Internal Medicine  [address] [place] [address]  Phone: [phone]  Fax: [phone]  Established Patient  Follow Up Time: Routine

## 2022-10-10 LAB — SURGICAL PATHOLOGY STUDY: SIGNIFICANT CHANGE UP

## 2022-10-11 DIAGNOSIS — K64.8 OTHER HEMORRHOIDS: ICD-10-CM

## 2022-10-11 DIAGNOSIS — Z79.84 LONG TERM (CURRENT) USE OF ORAL HYPOGLYCEMIC DRUGS: ICD-10-CM

## 2022-10-11 DIAGNOSIS — E11.9 TYPE 2 DIABETES MELLITUS WITHOUT COMPLICATIONS: ICD-10-CM

## 2022-10-11 DIAGNOSIS — E78.5 HYPERLIPIDEMIA, UNSPECIFIED: ICD-10-CM

## 2022-10-11 DIAGNOSIS — K59.00 CONSTIPATION, UNSPECIFIED: ICD-10-CM

## 2022-10-11 DIAGNOSIS — E03.9 HYPOTHYROIDISM, UNSPECIFIED: ICD-10-CM

## 2022-10-11 DIAGNOSIS — K63.5 POLYP OF COLON: ICD-10-CM

## 2022-11-30 ENCOUNTER — APPOINTMENT (OUTPATIENT)
Dept: GASTROENTEROLOGY | Facility: CLINIC | Age: 47
End: 2022-11-30

## 2022-11-30 ENCOUNTER — OUTPATIENT (OUTPATIENT)
Dept: OUTPATIENT SERVICES | Facility: HOSPITAL | Age: 47
LOS: 1 days | Discharge: HOME | End: 2022-11-30

## 2022-11-30 VITALS
HEART RATE: 73 BPM | DIASTOLIC BLOOD PRESSURE: 83 MMHG | WEIGHT: 185 LBS | TEMPERATURE: 97.3 F | HEIGHT: 60 IN | SYSTOLIC BLOOD PRESSURE: 121 MMHG | BODY MASS INDEX: 36.32 KG/M2 | OXYGEN SATURATION: 97 %

## 2022-11-30 DIAGNOSIS — Z12.11 ENCOUNTER FOR SCREENING FOR MALIGNANT NEOPLASM OF COLON: ICD-10-CM

## 2022-11-30 DIAGNOSIS — K59.00 CONSTIPATION, UNSPECIFIED: ICD-10-CM

## 2022-11-30 PROBLEM — E03.9 HYPOTHYROIDISM, UNSPECIFIED: Chronic | Status: ACTIVE | Noted: 2022-10-06

## 2022-11-30 PROBLEM — E11.9 TYPE 2 DIABETES MELLITUS WITHOUT COMPLICATIONS: Chronic | Status: ACTIVE | Noted: 2022-10-06

## 2022-11-30 PROCEDURE — 99213 OFFICE O/P EST LOW 20 MIN: CPT | Mod: GC

## 2022-11-30 NOTE — ASSESSMENT
[FreeTextEntry1] : #colon cancer screening\par - S/p colonoscopy 10/6/22 which showed 5mm polyp in sigmoid colon, internal hemorrhoids; needs 3yr follow up due to only fair prep and small polyps could have been missed. \par - path of sigmoid polyp 10/6/22: incipient hyperplastic polyp\par - f/u prn and in 3 years for repeat colonoscopy\par \par #History of constipation , controlled \par Rec:\par High fiber diet \par MiraLAX PRN \par \par \par

## 2022-11-30 NOTE — HISTORY OF PRESENT ILLNESS
[FreeTextEntry1] : S/p colonoscopy 10/6/22 which showed 5 mm polyp in sigmoid colon, internal hemorrhoids; needs 3 yr follow up due to only fair prep. \par

## 2022-11-30 NOTE — REVIEW OF SYSTEMS
[Negative] : Respiratory [Chest Pain] : no chest pain [Palpitations] : no palpitations [Abdominal Pain] : no abdominal pain [Vomiting] : no vomiting [Heartburn] : no heartburn [Fecal Incontinence (soiling)] : no fecal incontinence [Joint Swelling] : no joint swelling [Joint Stiffness] : no joint stiffness [Skin Lesions] : no skin lesions [Skin Wound] : no skin wound [Confused] : no confusion [Dizziness] : no dizziness

## 2022-11-30 NOTE — PHYSICAL EXAM

## 2023-04-06 ENCOUNTER — OUTPATIENT (OUTPATIENT)
Dept: OUTPATIENT SERVICES | Facility: HOSPITAL | Age: 48
LOS: 1 days | End: 2023-04-06
Payer: COMMERCIAL

## 2023-04-06 ENCOUNTER — APPOINTMENT (OUTPATIENT)
Dept: INTERNAL MEDICINE | Facility: CLINIC | Age: 48
End: 2023-04-06
Payer: COMMERCIAL

## 2023-04-06 ENCOUNTER — NON-APPOINTMENT (OUTPATIENT)
Age: 48
End: 2023-04-06

## 2023-04-06 VITALS
DIASTOLIC BLOOD PRESSURE: 78 MMHG | WEIGHT: 182 LBS | BODY MASS INDEX: 35.73 KG/M2 | OXYGEN SATURATION: 98 % | HEIGHT: 60 IN | HEART RATE: 74 BPM | TEMPERATURE: 97.7 F | SYSTOLIC BLOOD PRESSURE: 116 MMHG

## 2023-04-06 DIAGNOSIS — Z00.00 ENCOUNTER FOR GENERAL ADULT MEDICAL EXAMINATION W/OUT ABNORMAL FINDINGS: ICD-10-CM

## 2023-04-06 DIAGNOSIS — Z00.00 ENCOUNTER FOR GENERAL ADULT MEDICAL EXAMINATION WITHOUT ABNORMAL FINDINGS: ICD-10-CM

## 2023-04-06 PROCEDURE — 99214 OFFICE O/P EST MOD 30 MIN: CPT | Mod: GC

## 2023-04-06 PROCEDURE — 99214 OFFICE O/P EST MOD 30 MIN: CPT

## 2023-04-06 RX ORDER — CICLOPIROX 80 MG/ML
8 SOLUTION TOPICAL
Qty: 1 | Refills: 2 | Status: DISCONTINUED | COMMUNITY
Start: 2022-07-11 | End: 2023-04-06

## 2023-04-06 NOTE — ASSESSMENT
[FreeTextEntry1] : #Hypothyroidism\par - TSH 3.59 on levothyroxine 75mcg daily\par - continue current dose of levothyroxine\par \par #NIDDM\par - Hb A1C 6.1 on metformin 500mg twice daily\par - continue metformin\par - dietary/lifestyle modification \par - ophthal referral for 7/23\par - podiatry referral for diabetic foot exam\par \par #Transaminitis\par - resolved\par \par #HLD \par -  triglyceride 181\par - start atorvastatin 20mg at bedtime\par \par #Vitamin D insuff.\par - 25-OH Vitamin D 27 on D3 1000unit daily\par - Advised pt. to take OTC D3 2000unit daily \par \par # HCM\par - COVID Vaccine x3 Moderna \par - Tdap Vaccine UTD\par - RTC 6 months or prn\par - blood work prior to next visit

## 2023-04-06 NOTE — PHYSICAL EXAM
[No Acute Distress] : no acute distress [Normal Sclera/Conjunctiva] : normal sclera/conjunctiva [Normal Outer Ear/Nose] : the outer ears and nose were normal in appearance [No JVD] : no jugular venous distention [No Respiratory Distress] : no respiratory distress  [Normal Rate] : normal rate  [No Edema] : there was no peripheral edema [Soft] : abdomen soft [Grossly Normal Strength/Tone] : grossly normal strength/tone [Coordination Grossly Intact] : coordination grossly intact [Normal Insight/Judgement] : insight and judgment were intact [Normal] : no joint swelling and grossly normal strength and tone

## 2023-04-06 NOTE — HISTORY OF PRESENT ILLNESS
[FreeTextEntry1] : 48yo male with NIDDM, hypothyroidism, hyperlipidemia, transaminitis, constipation, arthritis, and Vit. D insuff.; last seen 9/29/22, levothyroxine increase to 75mcg daily for TSH 5.14; here for follow up.  Pt. denies any complaint, no CP, no SOB, no palpitation, no n/v/diarrhea, no polyuria/polydipsia/polyphagia, no tingling/numbness of foot.  Had blood work 2/22/23 A1C 6.1, , triglyceride 181, TSH 3.59, 25-OH Vit. D 27.  Seen GI 11/30/22 for post colonoscopy follow up, had colonoscopy 10/6/22, repeat 3 years (11/2025)\par

## 2023-04-13 DIAGNOSIS — E55.9 VITAMIN D DEFICIENCY, UNSPECIFIED: ICD-10-CM

## 2023-04-13 DIAGNOSIS — E78.5 HYPERLIPIDEMIA, UNSPECIFIED: ICD-10-CM

## 2023-04-13 DIAGNOSIS — Z00.00 ENCOUNTER FOR GENERAL ADULT MEDICAL EXAMINATION WITHOUT ABNORMAL FINDINGS: ICD-10-CM

## 2023-04-13 DIAGNOSIS — E11.9 TYPE 2 DIABETES MELLITUS WITHOUT COMPLICATIONS: ICD-10-CM

## 2023-04-13 DIAGNOSIS — E03.9 HYPOTHYROIDISM, UNSPECIFIED: ICD-10-CM

## 2023-05-18 ENCOUNTER — OUTPATIENT (OUTPATIENT)
Dept: OUTPATIENT SERVICES | Facility: HOSPITAL | Age: 48
LOS: 1 days | End: 2023-05-18
Payer: COMMERCIAL

## 2023-05-18 ENCOUNTER — APPOINTMENT (OUTPATIENT)
Dept: PODIATRY | Facility: CLINIC | Age: 48
End: 2023-05-18
Payer: COMMERCIAL

## 2023-05-18 DIAGNOSIS — Z00.00 ENCOUNTER FOR GENERAL ADULT MEDICAL EXAMINATION WITHOUT ABNORMAL FINDINGS: ICD-10-CM

## 2023-05-18 PROCEDURE — 99213 OFFICE O/P EST LOW 20 MIN: CPT

## 2023-05-24 DIAGNOSIS — B35.1 TINEA UNGUIUM: ICD-10-CM

## 2023-05-24 DIAGNOSIS — M79.672 PAIN IN LEFT FOOT: ICD-10-CM

## 2023-05-24 NOTE — ASSESSMENT
[FreeTextEntry1] : Assessment:\par Onychomycosis x10 \par \par Plan:\par patient seen and evaluated with all questions and concerns answerd\par Demonstrated the use of ciclopiroc to be applied once daily to all toe nails\par RX Ciclopiroc 8% solution to be applied daily with removal after 7 days with ETOH\par And will also start terbinafine patient is aware of all possible complications with terbinafine but patient is in agreement and is aware that it will require about 3 months of treatment with possible follow-up treatment for another 6 months and that there will be tight monitoring of his hepatic panel\par RTC 1 month for f/u  [Verbal] : verbal [Patient] : patient [Good - alert, interested, motivated] : Good - alert, interested, motivated

## 2023-05-24 NOTE — PHYSICAL EXAM
[General Appearance - Alert] : alert [General Appearance - In No Acute Distress] : in no acute distress [General Appearance - Well Nourished] : well nourished [General Appearance - Well Developed] : well developed [General Appearance - Well-Appearing] : healthy appearing [Ankle Swelling (On Exam)] : not present [Varicose Veins Of Lower Extremities] : not present [Delayed in the Right Toes] : capillary refills normal in right toes [Delayed in the Left Toes] : capillary refills normal in the left toes [2+] : left foot dorsalis pedis 2+ [No Joint Swelling] : no joint swelling [Pes Planus] : pes planus deformity [Skin Color & Pigmentation] : normal skin color and pigmentation [Skin Turgor] : normal skin turgor [] : no rash [Skin Lesions] : no skin lesions [Foot Ulcer] : no foot ulcer [Skin Induration] : no skin induration [Diminished Throughout Right Foot] : normal sensation with monofilament testing throughout right foot [Diminished Throughout Left Foot] : normal sensation with monofilament testing throughout left foot [Oriented To Time, Place, And Person] : oriented to person, place, and time [Impaired Insight] : insight and judgment were intact [Affect] : the affect was normal [Mood] : the mood was normal

## 2023-05-26 ENCOUNTER — OUTPATIENT (OUTPATIENT)
Dept: OUTPATIENT SERVICES | Facility: HOSPITAL | Age: 48
LOS: 1 days | End: 2023-05-26
Payer: COMMERCIAL

## 2023-05-26 ENCOUNTER — APPOINTMENT (OUTPATIENT)
Dept: OPHTHALMOLOGY | Facility: CLINIC | Age: 48
End: 2023-05-26
Payer: COMMERCIAL

## 2023-05-26 DIAGNOSIS — R73.03 PREDIABETES: ICD-10-CM

## 2023-05-26 DIAGNOSIS — H53.8 OTHER VISUAL DISTURBANCES: ICD-10-CM

## 2023-05-26 PROCEDURE — 92012 INTRM OPH EXAM EST PATIENT: CPT

## 2023-05-26 PROCEDURE — 92134 CPTRZ OPH DX IMG PST SGM RTA: CPT | Mod: 26

## 2023-05-26 PROCEDURE — 92134 CPTRZ OPH DX IMG PST SGM RTA: CPT

## 2023-08-17 ENCOUNTER — OUTPATIENT (OUTPATIENT)
Dept: OUTPATIENT SERVICES | Facility: HOSPITAL | Age: 48
LOS: 1 days | End: 2023-08-17
Payer: COMMERCIAL

## 2023-08-17 DIAGNOSIS — B35.1 TINEA UNGUIUM: ICD-10-CM

## 2023-08-17 LAB
ALBUMIN SERPL ELPH-MCNC: 4.9 G/DL
ALP BLD-CCNC: 90 U/L
ALT SERPL-CCNC: 36 U/L
AST SERPL-CCNC: 22 U/L
BILIRUB DIRECT SERPL-MCNC: <0.2 MG/DL
BILIRUB INDIRECT SERPL-MCNC: NORMAL MG/DL
BILIRUB SERPL-MCNC: 0.5 MG/DL
PROT SERPL-MCNC: 7.5 G/DL

## 2023-08-17 PROCEDURE — 36415 COLL VENOUS BLD VENIPUNCTURE: CPT

## 2023-08-17 PROCEDURE — 80076 HEPATIC FUNCTION PANEL: CPT

## 2023-08-18 DIAGNOSIS — B35.1 TINEA UNGUIUM: ICD-10-CM

## 2023-08-24 ENCOUNTER — APPOINTMENT (OUTPATIENT)
Dept: PODIATRY | Facility: CLINIC | Age: 48
End: 2023-08-24
Payer: COMMERCIAL

## 2023-08-24 ENCOUNTER — OUTPATIENT (OUTPATIENT)
Dept: OUTPATIENT SERVICES | Facility: HOSPITAL | Age: 48
LOS: 1 days | End: 2023-08-24
Payer: COMMERCIAL

## 2023-08-24 DIAGNOSIS — Z00.00 ENCOUNTER FOR GENERAL ADULT MEDICAL EXAMINATION WITHOUT ABNORMAL FINDINGS: ICD-10-CM

## 2023-08-24 PROCEDURE — 99213 OFFICE O/P EST LOW 20 MIN: CPT

## 2023-08-30 NOTE — HISTORY OF PRESENT ILLNESS
[Sneakers] : dante [FreeTextEntry1] : 45yo M who presents today with nonpainful fungal toe nails. Patient says his nails have been discolored for 14 years and he has never treated them before.  Patient has no other pedal complaints at this time.  Hepatic bloodwork from August 17, 2023 normal Ciclopirox and terbinafine helping, patient has been taking daily

## 2023-08-30 NOTE — PHYSICAL EXAM
[General Appearance - Alert] : alert [General Appearance - In No Acute Distress] : in no acute distress [General Appearance - Well Nourished] : well nourished [General Appearance - Well Developed] : well developed [General Appearance - Well-Appearing] : healthy appearing [2+] : left foot dorsalis pedis 2+ [No Joint Swelling] : no joint swelling [Pes Planus] : pes planus deformity [Skin Color & Pigmentation] : normal skin color and pigmentation [Skin Lesions] : no skin lesions [Oriented To Time, Place, And Person] : oriented to person, place, and time [Impaired Insight] : insight and judgment were intact [Affect] : the affect was normal [Mood] : the mood was normal [Ankle Swelling (On Exam)] : not present [Varicose Veins Of Lower Extremities] : not present [] : not present [Delayed in the Right Toes] : capillary refills normal in right toes [Delayed in the Left Toes] : capillary refills normal in the left toes [Foot Ulcer] : no foot ulcer [Skin Induration] : no skin induration [FreeTextEntry1] : Nails improving in appearance, mostly clear [Diminished Throughout Right Foot] : normal sensation with monofilament testing throughout right foot [Diminished Throughout Left Foot] : normal sensation with monofilament testing throughout left foot

## 2023-08-30 NOTE — ASSESSMENT
[Verbal] : verbal [Patient] : patient [Good - alert, interested, motivated] : Good - alert, interested, motivated [FreeTextEntry1] : Assessment: Onychomycosis x10   Plan: patient seen and evaluated with all questions and concerns answerd Demonstrated the use of ciclopiroc to be applied once daily to all toe nails RX Ciclopiroc 8% solution to be applied daily with removal after 7 days with ETOH- continue Renewed terbinafine for 3rd month of treatment If needed, will renew at next appt RTC 1 month for f/u

## 2023-08-31 DIAGNOSIS — E11.9 TYPE 2 DIABETES MELLITUS WITHOUT COMPLICATIONS: ICD-10-CM

## 2023-08-31 DIAGNOSIS — M79.671 PAIN IN RIGHT FOOT: ICD-10-CM

## 2023-08-31 DIAGNOSIS — M79.672 PAIN IN LEFT FOOT: ICD-10-CM

## 2023-08-31 DIAGNOSIS — E11.42 TYPE 2 DIABETES MELLITUS WITH DIABETIC POLYNEUROPATHY: ICD-10-CM
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
## 2023-09-21 ENCOUNTER — OUTPATIENT (OUTPATIENT)
Dept: OUTPATIENT SERVICES | Facility: HOSPITAL | Age: 48
LOS: 1 days | End: 2023-09-21
Payer: COMMERCIAL

## 2023-09-21 DIAGNOSIS — E03.9 HYPOTHYROIDISM, UNSPECIFIED: ICD-10-CM

## 2023-09-21 DIAGNOSIS — Z00.00 ENCOUNTER FOR GENERAL ADULT MEDICAL EXAMINATION WITHOUT ABNORMAL FINDINGS: ICD-10-CM

## 2023-09-21 LAB
25(OH)D3 SERPL-MCNC: 30 NG/ML
ALBUMIN SERPL ELPH-MCNC: 5 G/DL
ALP BLD-CCNC: 85 U/L
ALT SERPL-CCNC: 28 U/L
ANION GAP SERPL CALC-SCNC: 13 MMOL/L
AST SERPL-CCNC: 19 U/L
BILIRUB SERPL-MCNC: 0.7 MG/DL
BUN SERPL-MCNC: 19 MG/DL
CALCIUM SERPL-MCNC: 10.1 MG/DL
CHLORIDE SERPL-SCNC: 102 MMOL/L
CO2 SERPL-SCNC: 27 MMOL/L
CREAT SERPL-MCNC: 0.9 MG/DL
EGFR: 106 ML/MIN/1.73M2
ESTIMATED AVERAGE GLUCOSE: 123 MG/DL
GLUCOSE SERPL-MCNC: 110 MG/DL
HBA1C MFR BLD HPLC: 5.9 %
POTASSIUM SERPL-SCNC: 4.5 MMOL/L
PROT SERPL-MCNC: 7.6 G/DL
SODIUM SERPL-SCNC: 142 MMOL/L

## 2023-09-21 PROCEDURE — 85027 COMPLETE CBC AUTOMATED: CPT

## 2023-09-21 PROCEDURE — 83036 HEMOGLOBIN GLYCOSYLATED A1C: CPT

## 2023-09-21 PROCEDURE — 80061 LIPID PANEL: CPT

## 2023-09-21 PROCEDURE — 82306 VITAMIN D 25 HYDROXY: CPT

## 2023-09-21 PROCEDURE — 80053 COMPREHEN METABOLIC PANEL: CPT

## 2023-09-22 DIAGNOSIS — Z00.00 ENCOUNTER FOR GENERAL ADULT MEDICAL EXAMINATION WITHOUT ABNORMAL FINDINGS: ICD-10-CM

## 2023-09-22 DIAGNOSIS — E03.9 HYPOTHYROIDISM, UNSPECIFIED: ICD-10-CM

## 2023-09-22 LAB
CHOLEST SERPL-MCNC: 190 MG/DL
HDLC SERPL-MCNC: 47 MG/DL
LDLC SERPL CALC-MCNC: 115 MG/DL
NONHDLC SERPL-MCNC: 143 MG/DL
TRIGL SERPL-MCNC: 140 MG/DL

## 2023-09-28 ENCOUNTER — APPOINTMENT (OUTPATIENT)
Dept: PODIATRY | Facility: CLINIC | Age: 48
End: 2023-09-28
Payer: COMMERCIAL

## 2023-09-28 ENCOUNTER — OUTPATIENT (OUTPATIENT)
Dept: OUTPATIENT SERVICES | Facility: HOSPITAL | Age: 48
LOS: 1 days | End: 2023-09-28
Payer: COMMERCIAL

## 2023-09-28 ENCOUNTER — APPOINTMENT (OUTPATIENT)
Dept: INTERNAL MEDICINE | Facility: CLINIC | Age: 48
End: 2023-09-28
Payer: COMMERCIAL

## 2023-09-28 VITALS
TEMPERATURE: 97.2 F | DIASTOLIC BLOOD PRESSURE: 84 MMHG | HEART RATE: 71 BPM | HEIGHT: 60 IN | OXYGEN SATURATION: 97 % | SYSTOLIC BLOOD PRESSURE: 117 MMHG | WEIGHT: 181.13 LBS | BODY MASS INDEX: 35.56 KG/M2

## 2023-09-28 DIAGNOSIS — Z23 ENCOUNTER FOR IMMUNIZATION: ICD-10-CM

## 2023-09-28 DIAGNOSIS — Z00.00 ENCOUNTER FOR GENERAL ADULT MEDICAL EXAMINATION WITHOUT ABNORMAL FINDINGS: ICD-10-CM

## 2023-09-28 PROCEDURE — 99214 OFFICE O/P EST MOD 30 MIN: CPT | Mod: GC

## 2023-09-28 PROCEDURE — 90471 IMMUNIZATION ADMIN: CPT | Mod: 25

## 2023-09-28 PROCEDURE — 99213 OFFICE O/P EST LOW 20 MIN: CPT

## 2023-09-28 PROCEDURE — 90686 IIV4 VACC NO PRSV 0.5 ML IM: CPT

## 2023-09-28 PROCEDURE — 99214 OFFICE O/P EST MOD 30 MIN: CPT

## 2023-09-28 RX ORDER — TERBINAFINE HYDROCHLORIDE 250 MG/1
250 TABLET ORAL DAILY
Qty: 30 | Refills: 0 | Status: DISCONTINUED | COMMUNITY
Start: 2023-05-18 | End: 2023-09-28

## 2023-09-28 RX ORDER — TERBINAFINE HYDROCHLORIDE 250 MG/1
250 TABLET ORAL
Qty: 24 | Refills: 0 | Status: ACTIVE | COMMUNITY
Start: 2023-09-28 | End: 1900-01-01

## 2023-09-29 DIAGNOSIS — E03.9 HYPOTHYROIDISM, UNSPECIFIED: ICD-10-CM

## 2023-09-29 DIAGNOSIS — E78.5 HYPERLIPIDEMIA, UNSPECIFIED: ICD-10-CM

## 2023-09-29 DIAGNOSIS — Z23 ENCOUNTER FOR IMMUNIZATION: ICD-10-CM

## 2023-09-29 DIAGNOSIS — E55.9 VITAMIN D DEFICIENCY, UNSPECIFIED: ICD-10-CM

## 2023-09-29 DIAGNOSIS — E11.9 TYPE 2 DIABETES MELLITUS WITHOUT COMPLICATIONS: ICD-10-CM
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
## 2023-10-02 DIAGNOSIS — B35.1 TINEA UNGUIUM: ICD-10-CM
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
## 2023-10-02 DIAGNOSIS — E11.9 TYPE 2 DIABETES MELLITUS WITHOUT COMPLICATIONS: ICD-10-CM

## 2023-10-02 DIAGNOSIS — M79.672 PAIN IN LEFT FOOT: ICD-10-CM

## 2023-10-02 DIAGNOSIS — M79.671 PAIN IN RIGHT FOOT: ICD-10-CM

## 2023-11-30 ENCOUNTER — OUTPATIENT (OUTPATIENT)
Dept: OUTPATIENT SERVICES | Facility: HOSPITAL | Age: 48
LOS: 1 days | End: 2023-11-30
Payer: COMMERCIAL

## 2023-11-30 ENCOUNTER — APPOINTMENT (OUTPATIENT)
Dept: PODIATRY | Facility: CLINIC | Age: 48
End: 2023-11-30
Payer: COMMERCIAL

## 2023-11-30 DIAGNOSIS — G89.29 PAIN IN LEFT FOOT: ICD-10-CM

## 2023-11-30 DIAGNOSIS — Z00.00 ENCOUNTER FOR GENERAL ADULT MEDICAL EXAMINATION WITHOUT ABNORMAL FINDINGS: ICD-10-CM

## 2023-11-30 DIAGNOSIS — M79.671 PAIN IN RIGHT FOOT: ICD-10-CM

## 2023-11-30 DIAGNOSIS — M79.672 PAIN IN LEFT FOOT: ICD-10-CM

## 2023-11-30 DIAGNOSIS — G89.29 PAIN IN RIGHT FOOT: ICD-10-CM

## 2023-11-30 PROCEDURE — 11730 AVULSION NAIL PLATE SIMPLE 1: CPT | Mod: RT

## 2023-11-30 PROCEDURE — 99213 OFFICE O/P EST LOW 20 MIN: CPT | Mod: 25

## 2023-11-30 RX ORDER — OXYCODONE AND ACETAMINOPHEN 5; 325 MG/1; MG/1
5-325 TABLET ORAL
Qty: 18 | Refills: 0 | Status: ACTIVE | COMMUNITY
Start: 2023-11-30 | End: 1900-01-01

## 2023-11-30 RX ORDER — CICLOPIROX 80 MG/ML
8 SOLUTION TOPICAL
Qty: 1 | Refills: 5 | Status: ACTIVE | COMMUNITY
Start: 2023-05-18 | End: 1900-01-01

## 2023-11-30 RX ORDER — IBUPROFEN 800 MG/1
800 TABLET, FILM COATED ORAL
Qty: 20 | Refills: 1 | Status: ACTIVE | COMMUNITY
Start: 2023-11-30 | End: 1900-01-01

## 2023-12-04 ENCOUNTER — OUTPATIENT (OUTPATIENT)
Dept: OUTPATIENT SERVICES | Facility: HOSPITAL | Age: 48
LOS: 1 days | End: 2023-12-04
Payer: COMMERCIAL

## 2023-12-04 ENCOUNTER — APPOINTMENT (OUTPATIENT)
Dept: PODIATRY | Facility: CLINIC | Age: 48
End: 2023-12-04
Payer: COMMERCIAL

## 2023-12-04 DIAGNOSIS — L60.0 INGROWING NAIL: ICD-10-CM
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
## 2023-12-04 DIAGNOSIS — B35.1 TINEA UNGUIUM: ICD-10-CM

## 2023-12-04 DIAGNOSIS — E11.9 TYPE 2 DIABETES MELLITUS WITHOUT COMPLICATIONS: ICD-10-CM

## 2023-12-04 DIAGNOSIS — Z00.00 ENCOUNTER FOR GENERAL ADULT MEDICAL EXAMINATION WITHOUT ABNORMAL FINDINGS: ICD-10-CM
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
## 2023-12-04 PROBLEM — M79.672 CHRONIC FOOT PAIN, LEFT: Status: ACTIVE | Noted: 2017-06-05

## 2023-12-04 PROBLEM — M79.671 CHRONIC FOOT PAIN, RIGHT: Status: ACTIVE | Noted: 2017-06-05

## 2023-12-04 PROCEDURE — 99213 OFFICE O/P EST LOW 20 MIN: CPT

## 2023-12-12 DIAGNOSIS — L60.0 INGROWING NAIL: ICD-10-CM
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
## 2023-12-12 DIAGNOSIS — B35.1 TINEA UNGUIUM: ICD-10-CM

## 2023-12-12 DIAGNOSIS — M79.672 PAIN IN LEFT FOOT: ICD-10-CM

## 2023-12-21 ENCOUNTER — OUTPATIENT (OUTPATIENT)
Dept: OUTPATIENT SERVICES | Facility: HOSPITAL | Age: 48
LOS: 1 days | End: 2023-12-21
Payer: COMMERCIAL

## 2023-12-21 ENCOUNTER — APPOINTMENT (OUTPATIENT)
Dept: PODIATRY | Facility: CLINIC | Age: 48
End: 2023-12-21
Payer: COMMERCIAL

## 2023-12-21 DIAGNOSIS — Z00.00 ENCOUNTER FOR GENERAL ADULT MEDICAL EXAMINATION WITHOUT ABNORMAL FINDINGS: ICD-10-CM

## 2023-12-21 DIAGNOSIS — B35.1 TINEA UNGUIUM: ICD-10-CM

## 2023-12-21 DIAGNOSIS — L60.0 TINEA UNGUIUM: ICD-10-CM

## 2023-12-21 PROCEDURE — 11730 AVULSION NAIL PLATE SIMPLE 1: CPT

## 2023-12-21 RX ORDER — AMOXICILLIN 875 MG/1
875 TABLET, FILM COATED ORAL 3 TIMES DAILY
Qty: 42 | Refills: 0 | Status: ACTIVE | COMMUNITY
Start: 2023-12-21 | End: 1900-01-01

## 2023-12-21 NOTE — ASSESSMENT
[FreeTextEntry1] : Assessment: Ingrown R hallux nail   Plan: patient seen and evaluated with all questions and concerns answerd Continue ciclopiroc solution to nails qd.  See procedure note; Total nail avulsion   RTC 1 month for f/u  [Verbal] : verbal [Patient] : patient [Good - alert, interested, motivated] : Good - alert, interested, motivated

## 2023-12-21 NOTE — PHYSICAL EXAM
[General Appearance - Alert] : alert [General Appearance - In No Acute Distress] : in no acute distress [General Appearance - Well Nourished] : well nourished [General Appearance - Well Developed] : well developed [General Appearance - Well-Appearing] : healthy appearing [Ankle Swelling (On Exam)] : not present [Varicose Veins Of Lower Extremities] : not present [Delayed in the Right Toes] : capillary refills normal in right toes [Delayed in the Left Toes] : capillary refills normal in the left toes [2+] : left foot dorsalis pedis 2+ [No Joint Swelling] : no joint swelling [Pes Planus] : pes planus deformity [] : normal strength/tone [Skin Color & Pigmentation] : normal skin color and pigmentation [Skin Lesions] : no skin lesions [Foot Ulcer] : no foot ulcer [Skin Induration] : no skin induration [FreeTextEntry1] : Nails improving in appearance, mostly clear [Diminished Throughout Right Foot] : normal sensation with monofilament testing throughout right foot [Diminished Throughout Left Foot] : normal sensation with monofilament testing throughout left foot [Oriented To Time, Place, And Person] : oriented to person, place, and time [Impaired Insight] : insight and judgment were intact [Affect] : the affect was normal [Mood] : the mood was normal

## 2023-12-21 NOTE — HISTORY OF PRESENT ILLNESS
[Sneakers] : dante [FreeTextEntry1] : 48 yo M who presents today with nonpainful fungal toe nails. Patient says his nails have been discolored for 14 years and he has never treated them before.  Patient has no other pedal complaints at this time.  Hepatic bloodwork from August 17, 2023 normal Ciclopirox and terbinafine helping, patient has still been taking daily  11/30/23 returns to clinic for continued R hallux nail pain.

## 2023-12-21 NOTE — PROCEDURE
[Other:____] : ~M [unfilled] [Right Foot] : was performed on the right foot [Therapeutic] : therapeutic [Patient] : the patient [Risks] : risks [Ethyl Chloride] : ethyl chloride [___ ml Inj] : [unfilled] ~Uml [1%] : 1%  [Without Epi] : without epinephrine [Betadine] : betadine solution [Alcohol] : alcohol [25 gauge] : A 25 gauge needle was used [Tolerated Well] : tolerated the procedure well [No Complications] : There were no complications.

## 2023-12-29 PROBLEM — B35.1 ONYCHOMYCOSIS WITH INGROWN TOENAIL: Status: ACTIVE | Noted: 2017-06-05

## 2024-01-05 DIAGNOSIS — E11.42 TYPE 2 DIABETES MELLITUS WITH DIABETIC POLYNEUROPATHY: ICD-10-CM
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
## 2024-01-05 DIAGNOSIS — M79.671 PAIN IN RIGHT FOOT: ICD-10-CM

## 2024-01-05 DIAGNOSIS — B35.1 TINEA UNGUIUM: ICD-10-CM

## 2024-01-05 DIAGNOSIS — L60.0 INGROWING NAIL: ICD-10-CM

## 2024-03-21 ENCOUNTER — OUTPATIENT (OUTPATIENT)
Dept: OUTPATIENT SERVICES | Facility: HOSPITAL | Age: 49
LOS: 1 days | End: 2024-03-21
Payer: COMMERCIAL

## 2024-03-21 DIAGNOSIS — Z00.00 ENCOUNTER FOR GENERAL ADULT MEDICAL EXAMINATION WITHOUT ABNORMAL FINDINGS: ICD-10-CM

## 2024-03-21 PROCEDURE — 83036 HEMOGLOBIN GLYCOSYLATED A1C: CPT

## 2024-03-21 PROCEDURE — 82306 VITAMIN D 25 HYDROXY: CPT

## 2024-03-21 PROCEDURE — 84443 ASSAY THYROID STIM HORMONE: CPT

## 2024-03-21 PROCEDURE — 80053 COMPREHEN METABOLIC PANEL: CPT

## 2024-03-21 PROCEDURE — 36415 COLL VENOUS BLD VENIPUNCTURE: CPT

## 2024-03-21 PROCEDURE — 85027 COMPLETE CBC AUTOMATED: CPT

## 2024-03-21 PROCEDURE — 80061 LIPID PANEL: CPT

## 2024-03-22 DIAGNOSIS — Z00.00 ENCOUNTER FOR GENERAL ADULT MEDICAL EXAMINATION WITHOUT ABNORMAL FINDINGS: ICD-10-CM

## 2024-03-22 LAB
25(OH)D3 SERPL-MCNC: 18 NG/ML
ALBUMIN SERPL ELPH-MCNC: 5.1 G/DL
ALP BLD-CCNC: 89 U/L
ALT SERPL-CCNC: 32 U/L
ANION GAP SERPL CALC-SCNC: 17 MMOL/L
AST SERPL-CCNC: 19 U/L
BILIRUB SERPL-MCNC: 0.8 MG/DL
BUN SERPL-MCNC: 26 MG/DL
CALCIUM SERPL-MCNC: 10 MG/DL
CHLORIDE SERPL-SCNC: 103 MMOL/L
CHOLEST SERPL-MCNC: 100 MG/DL
CO2 SERPL-SCNC: 23 MMOL/L
CREAT SERPL-MCNC: 1 MG/DL
EGFR: 93 ML/MIN/1.73M2
ESTIMATED AVERAGE GLUCOSE: 146 MG/DL
GLUCOSE SERPL-MCNC: 106 MG/DL
HBA1C MFR BLD HPLC: 6.7 %
HCT VFR BLD CALC: 47.9 %
HDLC SERPL-MCNC: 44 MG/DL
HGB BLD-MCNC: 16.5 G/DL
LDLC SERPL CALC-MCNC: 41 MG/DL
MCHC RBC-ENTMCNC: 31.6 PG
MCHC RBC-ENTMCNC: 34.4 G/DL
MCV RBC AUTO: 91.8 FL
NONHDLC SERPL-MCNC: 56 MG/DL
PLATELET # BLD AUTO: 185 K/UL
PMV BLD AUTO: 0 /100 WBCS
PMV BLD: 11.4 FL
POTASSIUM SERPL-SCNC: 4.5 MMOL/L
PROT SERPL-MCNC: 7.7 G/DL
RBC # BLD: 5.22 M/UL
RBC # FLD: 12.5 %
SODIUM SERPL-SCNC: 143 MMOL/L
TRIGL SERPL-MCNC: 74 MG/DL
TSH SERPL-ACNC: 2.34 UIU/ML
WBC # FLD AUTO: 9.74 K/UL

## 2024-03-28 ENCOUNTER — OUTPATIENT (OUTPATIENT)
Dept: OUTPATIENT SERVICES | Facility: HOSPITAL | Age: 49
LOS: 1 days | End: 2024-03-28
Payer: COMMERCIAL

## 2024-03-28 ENCOUNTER — APPOINTMENT (OUTPATIENT)
Dept: INTERNAL MEDICINE | Facility: CLINIC | Age: 49
End: 2024-03-28
Payer: COMMERCIAL

## 2024-03-28 VITALS
HEIGHT: 60 IN | DIASTOLIC BLOOD PRESSURE: 81 MMHG | WEIGHT: 188.56 LBS | TEMPERATURE: 98.2 F | BODY MASS INDEX: 37.02 KG/M2 | HEART RATE: 89 BPM | OXYGEN SATURATION: 97 % | SYSTOLIC BLOOD PRESSURE: 121 MMHG

## 2024-03-28 DIAGNOSIS — E78.5 HYPERLIPIDEMIA, UNSPECIFIED: ICD-10-CM

## 2024-03-28 DIAGNOSIS — L60.0 INGROWING NAIL: ICD-10-CM

## 2024-03-28 DIAGNOSIS — Z00.00 ENCOUNTER FOR GENERAL ADULT MEDICAL EXAMINATION WITHOUT ABNORMAL FINDINGS: ICD-10-CM

## 2024-03-28 DIAGNOSIS — R74.01 ELEVATION OF LEVELS OF LIVER TRANSAMINASE LEVELS: ICD-10-CM

## 2024-03-28 DIAGNOSIS — E11.9 TYPE 2 DIABETES MELLITUS W/OUT COMPLICATIONS: ICD-10-CM

## 2024-03-28 DIAGNOSIS — E55.9 VITAMIN D DEFICIENCY, UNSPECIFIED: ICD-10-CM

## 2024-03-28 DIAGNOSIS — E03.9 HYPOTHYROIDISM, UNSPECIFIED: ICD-10-CM

## 2024-03-28 DIAGNOSIS — B35.1 TINEA UNGUIUM: ICD-10-CM

## 2024-03-28 PROCEDURE — 99214 OFFICE O/P EST MOD 30 MIN: CPT | Mod: GC

## 2024-03-28 PROCEDURE — G2211 COMPLEX E/M VISIT ADD ON: CPT

## 2024-03-28 PROCEDURE — 99214 OFFICE O/P EST MOD 30 MIN: CPT

## 2024-03-28 RX ORDER — LEVOTHYROXINE SODIUM 0.07 MG/1
75 TABLET ORAL
Qty: 30 | Refills: 5 | Status: ACTIVE | COMMUNITY
Start: 2022-09-29 | End: 1900-01-01

## 2024-03-28 RX ORDER — ATORVASTATIN CALCIUM 40 MG/1
40 TABLET, FILM COATED ORAL
Qty: 30 | Refills: 5 | Status: ACTIVE | COMMUNITY
Start: 2023-09-28 | End: 1900-01-01

## 2024-03-28 RX ORDER — METFORMIN HYDROCHLORIDE 500 MG/1
500 TABLET, COATED ORAL
Qty: 60 | Refills: 5 | Status: ACTIVE | COMMUNITY
Start: 2022-06-23 | End: 1900-01-01

## 2024-03-28 NOTE — HISTORY OF PRESENT ILLNESS
[FreeTextEntry1] : Follow up visit [de-identified] : 47yo male with NIDDM, hypothyroidism, hyperlipidemia, transaminitis, constipation, arthritis, and Vit. D insuff.

## 2024-03-28 NOTE — PHYSICAL EXAM
[No Acute Distress] : no acute distress [No JVD] : no jugular venous distention [No Respiratory Distress] : no respiratory distress  [Regular Rhythm] : with a regular rhythm [Normal Rate] : normal rate  [Soft] : abdomen soft [Non Tender] : non-tender [No CVA Tenderness] : no CVA  tenderness [Normal] : no CVA or spinal tenderness [Normal Gait] : normal gait [No Focal Deficits] : no focal deficits

## 2024-03-28 NOTE — ASSESSMENT
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 - vit d 18 - Start [id number] per week for 6 months  # HCM - Colonoscopy in 2025 as per last [facility] report in october 2022 - Flu shot UTD - COVID Vaccine x3 Moderna - Tdap Vaccine UTD - RTC 6 months or prn - blood work: TSH, HBA1c,CBC, CMP, Lipid profile, vit d prior to next visit.

## 2024-03-29 DIAGNOSIS — R74.01 ELEVATION OF LEVELS OF LIVER TRANSAMINASE LEVELS: ICD-10-CM

## 2024-03-29 DIAGNOSIS — E03.9 HYPOTHYROIDISM, UNSPECIFIED: ICD-10-CM

## 2024-03-29 DIAGNOSIS — E55.9 VITAMIN D DEFICIENCY, UNSPECIFIED: ICD-10-CM

## 2024-03-29 DIAGNOSIS — E78.5 HYPERLIPIDEMIA, UNSPECIFIED: ICD-10-CM

## 2024-03-29 DIAGNOSIS — E11.9 TYPE 2 DIABETES MELLITUS WITHOUT COMPLICATIONS: ICD-10-CM

## 2024-07-26 ENCOUNTER — APPOINTMENT (OUTPATIENT)
Dept: OPHTHALMOLOGY | Facility: CLINIC | Age: 49
End: 2024-07-26

## 2024-07-26 PROCEDURE — 92012 INTRM OPH EXAM EST PATIENT: CPT

## 2024-08-29 ENCOUNTER — OUTPATIENT (OUTPATIENT)
Dept: OUTPATIENT SERVICES | Facility: HOSPITAL | Age: 49
LOS: 1 days | End: 2024-08-29
Payer: COMMERCIAL

## 2024-08-29 DIAGNOSIS — Z00.00 ENCOUNTER FOR GENERAL ADULT MEDICAL EXAMINATION WITHOUT ABNORMAL FINDINGS: ICD-10-CM

## 2024-08-29 PROCEDURE — 83036 HEMOGLOBIN GLYCOSYLATED A1C: CPT

## 2024-08-29 PROCEDURE — 82306 VITAMIN D 25 HYDROXY: CPT

## 2024-08-29 PROCEDURE — 85027 COMPLETE CBC AUTOMATED: CPT

## 2024-08-29 PROCEDURE — 84443 ASSAY THYROID STIM HORMONE: CPT

## 2024-08-29 PROCEDURE — 80061 LIPID PANEL: CPT

## 2024-08-29 PROCEDURE — 80053 COMPREHEN METABOLIC PANEL: CPT

## 2024-08-30 DIAGNOSIS — Z00.00 ENCOUNTER FOR GENERAL ADULT MEDICAL EXAMINATION WITHOUT ABNORMAL FINDINGS: ICD-10-CM

## 2024-09-05 ENCOUNTER — OUTPATIENT (OUTPATIENT)
Dept: OUTPATIENT SERVICES | Facility: HOSPITAL | Age: 49
LOS: 1 days | End: 2024-09-05
Payer: COMMERCIAL

## 2024-09-05 ENCOUNTER — APPOINTMENT (OUTPATIENT)
Dept: INTERNAL MEDICINE | Facility: CLINIC | Age: 49
End: 2024-09-05
Payer: COMMERCIAL

## 2024-09-05 VITALS
WEIGHT: 178 LBS | OXYGEN SATURATION: 100 % | HEART RATE: 75 BPM | TEMPERATURE: 98.4 F | DIASTOLIC BLOOD PRESSURE: 79 MMHG | BODY MASS INDEX: 34.95 KG/M2 | HEIGHT: 60 IN | SYSTOLIC BLOOD PRESSURE: 123 MMHG

## 2024-09-05 DIAGNOSIS — L60.0 INGROWING NAIL: ICD-10-CM

## 2024-09-05 DIAGNOSIS — E78.5 HYPERLIPIDEMIA, UNSPECIFIED: ICD-10-CM

## 2024-09-05 DIAGNOSIS — Z23 ENCOUNTER FOR IMMUNIZATION: ICD-10-CM

## 2024-09-05 DIAGNOSIS — Z00.00 ENCOUNTER FOR GENERAL ADULT MEDICAL EXAMINATION WITHOUT ABNORMAL FINDINGS: ICD-10-CM

## 2024-09-05 DIAGNOSIS — E11.9 TYPE 2 DIABETES MELLITUS W/OUT COMPLICATIONS: ICD-10-CM

## 2024-09-05 DIAGNOSIS — Z00.00 ENCOUNTER FOR GENERAL ADULT MEDICAL EXAMINATION W/OUT ABNORMAL FINDINGS: ICD-10-CM

## 2024-09-05 DIAGNOSIS — B35.1 TINEA UNGUIUM: ICD-10-CM

## 2024-09-05 DIAGNOSIS — E55.9 VITAMIN D DEFICIENCY, UNSPECIFIED: ICD-10-CM

## 2024-09-05 DIAGNOSIS — E03.9 HYPOTHYROIDISM, UNSPECIFIED: ICD-10-CM

## 2024-09-05 PROCEDURE — 99214 OFFICE O/P EST MOD 30 MIN: CPT | Mod: 25,GC

## 2024-09-05 PROCEDURE — 90471 IMMUNIZATION ADMIN: CPT

## 2024-09-05 PROCEDURE — G2211 COMPLEX E/M VISIT ADD ON: CPT

## 2024-09-05 PROCEDURE — 99214 OFFICE O/P EST MOD 30 MIN: CPT | Mod: 25

## 2024-09-05 PROCEDURE — 90656 IIV3 VACC NO PRSV 0.5 ML IM: CPT

## 2024-09-05 RX ORDER — METFORMIN HYDROCHLORIDE 850 MG/1
850 TABLET, COATED ORAL TWICE DAILY
Qty: 60 | Refills: 6 | Status: ACTIVE | COMMUNITY
Start: 2024-09-05 | End: 1900-01-01

## 2024-09-05 NOTE — PHYSICAL EXAM
[Normal] : soft, non-tender, non-distended, no masses palpated, no HSM and normal bowel sounds [de-identified] : slightly cold LLE

## 2024-09-05 NOTE — HISTORY OF PRESENT ILLNESS
[de-identified] : 47 yo male with NIDDM, hypothyroidism, hyperlipidemia, and Vit. D insuff. : [id number] He reports being compliant with meds  Complaining of numbness at the level of the inner aspect of the left great toe. started 2 weeks ago. no pain at the level of the foot. no

## 2024-09-05 NOTE — ASSESSMENT
[FreeTextEntry1] : 47yo male with NIDDM, hypothyroidism, hyperlipidemia, transaminitis, constipation, arthritis, and Vit. D insuff.  #Hypothyroidism - TSH 4 on levothyroxine 75mcg daily - continue current dose of levothyroxine  #NIDDM - Hb A1C 5.9 (September 2023) -> 6.7 (March 2024) -> 7 (August 2024) - BMI 35 -> 36-> 34 (lost 10 lbs since March 2024) - creat 0.9 - On metformin 500 twice daily -> will increase the dose to 850 mg. - dietary/lifestyle modification - Being followed by podiatry and Optho  #Internal aspect of the Left great toe #Onchomycosis/ Ingrown nail of great toe - Total bilateral Nail avulsion bilateral great toes was performed on dec 2023 - Patient being followed by podiatry - duplex Arterial LLE (given that the extremity is slightly cold with hair loss; 2+ DP and PT pulses).   #HLD -  triglyceride 181===has now come down to LDL 38 and  - C/w atorvastatin 40mg at bedtime - dietary/lifestyle modification - low salt, fat/chol high fiber ADA diet  #Vitamin D insuff. - vit d 18 -> up to 51 now  - on Vitamin D 50.000IU per week -> switch to 2000u daily or 50K every 2 weeks.   # HCM - never smoker - Colonoscopy in 2025 as per last [facility] report in October 2022 - Flu shot: administered on 9/5/24.  - COVID Vaccine x3 Moderna - Tdap Vaccine UTD - RTC 6 months or prn - blood work: TSH, HBA1c,CBC, CMP, Lipid profile, vit d prior to next visit.

## 2024-09-12 DIAGNOSIS — Z23 ENCOUNTER FOR IMMUNIZATION: ICD-10-CM

## 2024-09-12 DIAGNOSIS — E55.9 VITAMIN D DEFICIENCY, UNSPECIFIED: ICD-10-CM

## 2024-09-12 DIAGNOSIS — E11.9 TYPE 2 DIABETES MELLITUS WITHOUT COMPLICATIONS: ICD-10-CM

## 2024-09-12 DIAGNOSIS — E03.9 HYPOTHYROIDISM, UNSPECIFIED: ICD-10-CM

## 2024-09-12 DIAGNOSIS — E78.5 HYPERLIPIDEMIA, UNSPECIFIED: ICD-10-CM

## 2025-04-10 ENCOUNTER — OUTPATIENT (OUTPATIENT)
Dept: OUTPATIENT SERVICES | Facility: HOSPITAL | Age: 50
LOS: 1 days | End: 2025-04-10
Payer: SUBSIDIZED

## 2025-04-10 PROCEDURE — 80061 LIPID PANEL: CPT

## 2025-04-10 PROCEDURE — 84443 ASSAY THYROID STIM HORMONE: CPT

## 2025-04-10 PROCEDURE — 80053 COMPREHEN METABOLIC PANEL: CPT

## 2025-04-10 PROCEDURE — 85025 COMPLETE CBC W/AUTO DIFF WBC: CPT

## 2025-04-10 PROCEDURE — 83036 HEMOGLOBIN GLYCOSYLATED A1C: CPT

## 2025-04-10 PROCEDURE — 82306 VITAMIN D 25 HYDROXY: CPT

## 2025-04-14 DIAGNOSIS — Z00.00 ENCOUNTER FOR GENERAL ADULT MEDICAL EXAMINATION WITHOUT ABNORMAL FINDINGS: ICD-10-CM

## 2025-04-15 DIAGNOSIS — Z00.00 ENCOUNTER FOR GENERAL ADULT MEDICAL EXAMINATION WITHOUT ABNORMAL FINDINGS: ICD-10-CM

## 2025-04-17 ENCOUNTER — APPOINTMENT (OUTPATIENT)
Dept: INTERNAL MEDICINE | Facility: CLINIC | Age: 50
End: 2025-04-17
Payer: COMMERCIAL

## 2025-04-17 ENCOUNTER — OUTPATIENT (OUTPATIENT)
Dept: OUTPATIENT SERVICES | Facility: HOSPITAL | Age: 50
LOS: 1 days | End: 2025-04-17
Payer: COMMERCIAL

## 2025-04-17 VITALS
DIASTOLIC BLOOD PRESSURE: 68 MMHG | HEART RATE: 70 BPM | TEMPERATURE: 99.3 F | HEIGHT: 60 IN | OXYGEN SATURATION: 97 % | WEIGHT: 177.25 LBS | SYSTOLIC BLOOD PRESSURE: 114 MMHG | BODY MASS INDEX: 34.8 KG/M2

## 2025-04-17 DIAGNOSIS — E78.5 HYPERLIPIDEMIA, UNSPECIFIED: ICD-10-CM

## 2025-04-17 DIAGNOSIS — Z00.00 ENCOUNTER FOR GENERAL ADULT MEDICAL EXAMINATION W/OUT ABNORMAL FINDINGS: ICD-10-CM

## 2025-04-17 DIAGNOSIS — Z00.00 ENCOUNTER FOR GENERAL ADULT MEDICAL EXAMINATION WITHOUT ABNORMAL FINDINGS: ICD-10-CM

## 2025-04-17 DIAGNOSIS — E55.9 VITAMIN D DEFICIENCY, UNSPECIFIED: ICD-10-CM

## 2025-04-17 DIAGNOSIS — E11.9 TYPE 2 DIABETES MELLITUS W/OUT COMPLICATIONS: ICD-10-CM

## 2025-04-17 DIAGNOSIS — E03.9 HYPOTHYROIDISM, UNSPECIFIED: ICD-10-CM

## 2025-04-17 PROCEDURE — 99214 OFFICE O/P EST MOD 30 MIN: CPT | Mod: GC

## 2025-04-17 PROCEDURE — 99214 OFFICE O/P EST MOD 30 MIN: CPT

## 2025-04-22 DIAGNOSIS — E55.9 VITAMIN D DEFICIENCY, UNSPECIFIED: ICD-10-CM

## 2025-04-22 DIAGNOSIS — E03.9 HYPOTHYROIDISM, UNSPECIFIED: ICD-10-CM

## 2025-04-22 DIAGNOSIS — E11.9 TYPE 2 DIABETES MELLITUS WITHOUT COMPLICATIONS: ICD-10-CM

## 2025-04-22 DIAGNOSIS — E78.5 HYPERLIPIDEMIA, UNSPECIFIED: ICD-10-CM

## 2025-05-06 ENCOUNTER — OUTPATIENT (OUTPATIENT)
Dept: OUTPATIENT SERVICES | Facility: HOSPITAL | Age: 50
LOS: 1 days | End: 2025-05-06
Payer: COMMERCIAL

## 2025-05-06 ENCOUNTER — APPOINTMENT (OUTPATIENT)
Dept: PODIATRY | Facility: CLINIC | Age: 50
End: 2025-05-06
Payer: COMMERCIAL

## 2025-05-06 DIAGNOSIS — Z00.00 ENCOUNTER FOR GENERAL ADULT MEDICAL EXAMINATION WITHOUT ABNORMAL FINDINGS: ICD-10-CM

## 2025-05-06 PROCEDURE — 99213 OFFICE O/P EST LOW 20 MIN: CPT

## 2025-05-06 PROCEDURE — G2211 COMPLEX E/M VISIT ADD ON: CPT

## 2025-05-06 RX ORDER — TERBINAFINE HYDROCHLORIDE 250 MG/1
250 TABLET ORAL DAILY
Qty: 14 | Refills: 1 | Status: ACTIVE | COMMUNITY
Start: 2025-05-06 | End: 1900-01-01
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
## 2025-05-13 DIAGNOSIS — B35.3 TINEA PEDIS: ICD-10-CM

## 2025-05-13 DIAGNOSIS — M79.672 PAIN IN LEFT FOOT: ICD-10-CM

## 2025-05-13 DIAGNOSIS — B35.1 TINEA UNGUIUM: ICD-10-CM
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
## 2025-05-13 DIAGNOSIS — M79.671 PAIN IN RIGHT FOOT: ICD-10-CM

## 2025-08-06 ENCOUNTER — APPOINTMENT (OUTPATIENT)
Dept: OPHTHALMOLOGY | Facility: CLINIC | Age: 50
End: 2025-08-06

## 2025-08-27 ENCOUNTER — APPOINTMENT (OUTPATIENT)
Dept: OPHTHALMOLOGY | Facility: CLINIC | Age: 50
End: 2025-08-27

## 2025-08-27 ENCOUNTER — OUTPATIENT (OUTPATIENT)
Dept: OUTPATIENT SERVICES | Facility: HOSPITAL | Age: 50
LOS: 1 days | End: 2025-08-27
Payer: COMMERCIAL

## 2025-08-27 DIAGNOSIS — H53.8 OTHER VISUAL DISTURBANCES: ICD-10-CM

## 2025-08-27 PROCEDURE — 92201 OPSCPY EXTND RTA DRAW UNI/BI: CPT

## 2025-08-27 PROCEDURE — 92134 CPTRZ OPH DX IMG PST SGM RTA: CPT

## 2025-08-27 PROCEDURE — 92014 COMPRE OPH EXAM EST PT 1/>: CPT

## 2025-08-28 DIAGNOSIS — H25.13 AGE-RELATED NUCLEAR CATARACT, BILATERAL: ICD-10-CM

## 2025-08-28 DIAGNOSIS — E11.9 TYPE 2 DIABETES MELLITUS WITHOUT COMPLICATIONS: ICD-10-CM

## 2025-09-08 ENCOUNTER — RX RENEWAL (OUTPATIENT)
Age: 50
End: 2025-09-08